# Patient Record
Sex: FEMALE | Employment: UNEMPLOYED | URBAN - METROPOLITAN AREA
[De-identification: names, ages, dates, MRNs, and addresses within clinical notes are randomized per-mention and may not be internally consistent; named-entity substitution may affect disease eponyms.]

---

## 2022-09-28 ENCOUNTER — LAB REQUISITION (OUTPATIENT)
Dept: LAB | Facility: CLINIC | Age: 22
End: 2022-09-28
Payer: COMMERCIAL

## 2022-09-28 DIAGNOSIS — U07.1 COVID-19: ICD-10-CM

## 2022-09-28 PROCEDURE — U0003 INFECTIOUS AGENT DETECTION BY NUCLEIC ACID (DNA OR RNA); SEVERE ACUTE RESPIRATORY SYNDROME CORONAVIRUS 2 (SARS-COV-2) (CORONAVIRUS DISEASE [COVID-19]), AMPLIFIED PROBE TECHNIQUE, MAKING USE OF HIGH THROUGHPUT TECHNOLOGIES AS DESCRIBED BY CMS-2020-01-R: HCPCS | Mod: ORL | Performed by: INTERNAL MEDICINE

## 2022-09-29 LAB — SARS-COV-2 RNA RESP QL NAA+PROBE: NEGATIVE

## 2023-12-05 ENCOUNTER — MEDICAL CORRESPONDENCE (OUTPATIENT)
Dept: HEALTH INFORMATION MANAGEMENT | Facility: CLINIC | Age: 23
End: 2023-12-05

## 2024-01-17 ENCOUNTER — TRANSFERRED RECORDS (OUTPATIENT)
Dept: HEALTH INFORMATION MANAGEMENT | Facility: CLINIC | Age: 24
End: 2024-01-17
Payer: COMMERCIAL

## 2024-01-17 LAB — PHQ9 SCORE: 14

## 2024-02-09 ENCOUNTER — TRANSCRIBE ORDERS (OUTPATIENT)
Dept: OTHER | Age: 24
End: 2024-02-09

## 2024-02-09 DIAGNOSIS — F60.3 BORDERLINE PERSONALITY DISORDER (H): ICD-10-CM

## 2024-02-09 DIAGNOSIS — F42.9 OBSESSIVE-COMPULSIVE DISORDER: Primary | ICD-10-CM

## 2024-02-09 DIAGNOSIS — F34.1 PERSISTENT DEPRESSIVE DISORDER: ICD-10-CM

## 2024-02-12 ENCOUNTER — TELEPHONE (OUTPATIENT)
Dept: NEUROPSYCHOLOGY | Facility: CLINIC | Age: 24
End: 2024-02-12
Payer: COMMERCIAL

## 2024-02-12 NOTE — TELEPHONE ENCOUNTER
Neuropsychology referral declined. Due to the overwhelmingly high volume of incoming referrals and a lengthy wait time, our clinic is currently limiting referrals to the [facility] system.  We apologize for the inconvenience. Ref prov notified via fax.    There are several community neuropsychologists that we might suggest, including:  Dr. Eliane Dexter - [phone]  Dr. Dez Bañuelos - [phone]   Dr. Melissa Stone - [phone]   Dr. Ayse Jerez - [phone]   Dr. Vanessa Gonzalez - [phone]   Dr. Iraida Quan-Oshea - [phone], [email or web address]  Dr. Sav Landin - [phone], [email or web address]   [facility] - [phone], [email or web address]   [facility], [email or web address]  [facility] - [phone], [email or web address]    Coco Duran  Psychometrist

## 2024-08-28 ENCOUNTER — TRANSCRIBE ORDERS (OUTPATIENT)
Dept: OTHER | Age: 24
End: 2024-08-28

## 2024-08-28 DIAGNOSIS — F81.9 LEARNING DISABILITY: Primary | ICD-10-CM

## 2024-08-28 DIAGNOSIS — Q21.11 OSTIUM SECUNDUM TYPE ATRIAL SEPTAL DEFECT: ICD-10-CM

## 2024-10-20 ASSESSMENT — ANXIETY QUESTIONNAIRES
3. WORRYING TOO MUCH ABOUT DIFFERENT THINGS: NOT AT ALL
2. NOT BEING ABLE TO STOP OR CONTROL WORRYING: SEVERAL DAYS
6. BECOMING EASILY ANNOYED OR IRRITABLE: SEVERAL DAYS
IF YOU CHECKED OFF ANY PROBLEMS ON THIS QUESTIONNAIRE, HOW DIFFICULT HAVE THESE PROBLEMS MADE IT FOR YOU TO DO YOUR WORK, TAKE CARE OF THINGS AT HOME, OR GET ALONG WITH OTHER PEOPLE: VERY DIFFICULT
5. BEING SO RESTLESS THAT IT IS HARD TO SIT STILL: NOT AT ALL
4. TROUBLE RELAXING: SEVERAL DAYS
1. FEELING NERVOUS, ANXIOUS, OR ON EDGE: SEVERAL DAYS
GAD7 TOTAL SCORE: 5
7. FEELING AFRAID AS IF SOMETHING AWFUL MIGHT HAPPEN: SEVERAL DAYS

## 2024-10-23 ENCOUNTER — VIRTUAL VISIT (OUTPATIENT)
Dept: PSYCHIATRY | Facility: CLINIC | Age: 24
End: 2024-10-23
Payer: COMMERCIAL

## 2024-10-23 DIAGNOSIS — F42.9 OBSESSIVE-COMPULSIVE DISORDER, UNSPECIFIED TYPE: ICD-10-CM

## 2024-10-23 DIAGNOSIS — F33.2 SEVERE RECURRENT MAJOR DEPRESSION WITHOUT PSYCHOTIC FEATURES (H): Primary | ICD-10-CM

## 2024-10-23 ASSESSMENT — PATIENT HEALTH QUESTIONNAIRE - PHQ9
10. IF YOU CHECKED OFF ANY PROBLEMS, HOW DIFFICULT HAVE THESE PROBLEMS MADE IT FOR YOU TO DO YOUR WORK, TAKE CARE OF THINGS AT HOME, OR GET ALONG WITH OTHER PEOPLE: EXTREMELY DIFFICULT
SUM OF ALL RESPONSES TO PHQ QUESTIONS 1-9: 21
SUM OF ALL RESPONSES TO PHQ QUESTIONS 1-9: 21

## 2024-10-23 ASSESSMENT — ANXIETY QUESTIONNAIRES
GAD7 TOTAL SCORE: 5
4. TROUBLE RELAXING: SEVERAL DAYS
1. FEELING NERVOUS, ANXIOUS, OR ON EDGE: SEVERAL DAYS
GAD7 TOTAL SCORE: 5
7. FEELING AFRAID AS IF SOMETHING AWFUL MIGHT HAPPEN: SEVERAL DAYS
6. BECOMING EASILY ANNOYED OR IRRITABLE: SEVERAL DAYS
8. IF YOU CHECKED OFF ANY PROBLEMS, HOW DIFFICULT HAVE THESE MADE IT FOR YOU TO DO YOUR WORK, TAKE CARE OF THINGS AT HOME, OR GET ALONG WITH OTHER PEOPLE?: VERY DIFFICULT
3. WORRYING TOO MUCH ABOUT DIFFERENT THINGS: NOT AT ALL
2. NOT BEING ABLE TO STOP OR CONTROL WORRYING: SEVERAL DAYS
IF YOU CHECKED OFF ANY PROBLEMS ON THIS QUESTIONNAIRE, HOW DIFFICULT HAVE THESE PROBLEMS MADE IT FOR YOU TO DO YOUR WORK, TAKE CARE OF THINGS AT HOME, OR GET ALONG WITH OTHER PEOPLE: VERY DIFFICULT
7. FEELING AFRAID AS IF SOMETHING AWFUL MIGHT HAPPEN: SEVERAL DAYS
GAD7 TOTAL SCORE: 5
5. BEING SO RESTLESS THAT IT IS HARD TO SIT STILL: NOT AT ALL

## 2024-10-23 NOTE — PROGRESS NOTES
"[facility] Treatment Resistant Depression Program  Diagnostic Assessment  A part of the [facility] Program    Melinda Welch MRN# [id number]   Age: 24 year old YOB: 2000     Date of Evaluation: 10/23/24  Start Time: 3:00; End Time: 4:15    Mode of communication: [employer] (HIPAA compliant, secure platform). Patient consented verbally to this mode of therapy today.  Reason for telehealth: COVID-19. This patient visit was converted to a telehealth visit to minimize exposure to COVID-19.    Originating Location (patient location): home, located in [place]  Distant Location (provider location): Home office, located in [place], [place], using appropriate privacy considerations and procedures         Care Team     PCP- No primary care provider on file.  Specialty Providers- none  Therapist- Dari Lozada at [facility]  Psychiatric Med Management Provider- Bolivar Mason  Other Mental Health Providers- [facility] worker    Referred by: psychiatrist  Referred for evaluation of:  depression         Contributors to the Assessment     Chart Reviewed.   Interview completed with Melinda Welch.  Releases of information signed?  no  Collateral information obtained? no           Chief Complaint     Depression with suicidal ideation that \"feels like a . I'm the poster child for depression\"         Psychiatric History and Present Illness      Melinda Welch is a 24 year old single (never ) female who goes by Melinda and uses she/her pronouns.    Melinda reports one lifetime episode(s) of depression and has a history of six psychiatric hospitalization(s).     Melinda's first episode of depression started before she was 14 years old. Melinda reports that since then there may have been on period of about six months without  symptoms.    Current psychosocial stressors include not working, applying for SSDI, waiver assessment process     Melinda is interested in TMS or ketamine " "treatment.      Past diagnoses: MDD, OCD, ADHD, borderline PD    Past medication trials: multiple trials    Hospitalizations: six, most recent 4/2023    Commitment: No, Current Sandoval order: No    ECT trials: No    TMS trials:  No      Ketamine:  No    Suicide attempts: Yes - three    Self-injurious behavior: No    Aggressive or violent behavior: No    Past Outpatient Programs & Services  Medication management, Outpatient individual psychotherapy, Intensive outpatient program (IOP), and DBT    Psych critical item history suicide attempt, trauma hx, multiple psychotropic trials, and psych hospitalization    Other mental health concerns discussed: anxiety, trauma, OCD    Sleep study: yes, no diagnosis, no CPAP    ADHD testing: yes, diagnosis, no medication    ASD assessment:  yes, multiple assessments with conflicting results about a diagnosis     Current Outpatient Programs & Services  Medication management, Outpatient individual psychotherapy, and Adult Rehabilitative Mental Health Services (ARM) Worker         Psychiatric Review of Systems (Completed M.I.N.I. Version 7.0.2)     A. DEPRESSION  Past 2 Weeks:  low mood nearly every day, anhedonia most of the time, appetite change (decrease), difficulties with sleep, low energy, worthlessness and/or guilt, difficulty concentrating, thinking or making decisions, and suicidal ideation with plan, without intent    Past Episode:  low mood nearly every day, anhedonia most of the time, difficulties with sleep, psychomotor changes (retardation), low energy, worthlessness and/or guilt, difficulty concentrating, thinking or making decisions, and suicidal ideation with plan, with intent    B. SUICIDALITY:  Risk: High  Current suicidal ideation: Yes, reports a plan, and denies intent.     -reports 4% in response to \"How likely are to you to try to kill yourself within the next 3 months on a scale from 0-100%?\"  -denies current SIB/Self Injurious Behavior and denies past " "SIB    -reports three lifetime suicide attempts.  She has notable risk factors for self-harm including previous suicide attempt, feels trapped, hopelessness, lives alone/ isolated, financial/legal stress, and new/ worsening medical issue.  However, risk is mitigated by describes a safety plan, future oriented, none to minimal alcohol use , commitment to family, and stable housing.      C. FOREIGN/HYPOMANIA  Current Episode:  none    Past Episode:  none    D. PANIC:  none    E. AGORAPHOBIA:  none    F. SOCIAL ANXIETY:  none    G. OBSESSIVE-COMPULSIVE:  obsessions and compulsions    Melinda reports a current diagnosis of OCD. She has engaged in ERP in the past and she will be re-starting ERP. She describes her compulsions as \"more mental compulsions\" than behavioral.    H. TRAUMA:  experienced traumatic event    I. ALCOHOL & J. NON-ALCOHOL:  See below    K. PSYCHOSIS:   none    L-M. EATING DISORDER:  Melinda reports past and present disordered eating behaviors. She is currently participating in a weight loss program. She notes that she has PCOS, which complicates eating, weight, and weight loss.    N. GENERALIZED ANXIETY:  none    O. RULE OUT MEDICAL, ORGANIC OR DRUG CAUSES FOR ALL DISORDERS  During any current disorder or past mood episode, patient reports:  A. Substance use or withdrawal: No  B. Medical illness: Yes    P. ANTISOCIAL PERSONALITY:  none     Other Cluster B Traits Identified (not formally assessed):  none discussed    PHQ-9 Developed by Klarissa Oliva,Mariam Queen,Jorge Galo and Colleagues,with an Educational Scott From [employer]  1.  Little interest or pleasure in doing things: (Patient-Rptd) Nearly every day  2.  Feeling down, depressed, or hopeless: (Patient-Rptd) Nearly every day  3.  Trouble falling or staying asleep, or sleeping too much: (Patient-Rptd) Several days  4.  Feeling tired or having little energy: (Patient-Rptd) More than half the days  5.  Poor appetite or overeating: " (Patient-Rptd) Nearly every day  6.  Feeling bad about yourself - or that you are a failure or have let yourself or your family down: (Patient-Rptd) More than half the days  7.  Trouble concentrating on things, such as reading the newspaper or watching television: (Patient-Rptd) Several days  8.  Moving or speaking so slowly that other people could have noticed. Or the opposite - being so fidgety or restless that you have been moving around a lot more than usual: (Patient-Rptd) Nearly every day  9.  Thoughts that you would be better off dead, or of hurting yourself in some way: (Patient-Rptd) Nearly every day  PHQ-9 Total Score: (Patient-Rptd) 21     SUBSTANCE USE HISTORY                                                                 CAGE-AID = 0    Have you felt you ought to cut down on your drinking or drug use? no  Have people annoyed you by criticizing your drinking or drug use? no  Have you felt bad or guilty about your drinking or drug use? no  Have you ever had a drink or used drugs first thing in the morning to steady your nerves or to get rid of a hangover? no      RECENT SUBSTANCE USE:     TOBACCO- none   CAFFEINE-  none, increases anxiety   ALCOHOL- none     CANNABIS- none            OTHER ILLICIT DRUGS- none    Past Use-   TOBACCO- none   CAFFEINE-  none, increases anxiety   ALCOHOL- none     CANNABIS- none            OTHER ILLICIT DRUGS- none    CD Treatment history: No  Medical consequences due to use (eg HIV/Hepatitis)- No  Legal consequences due to use- No    SOCIAL and FAMILY HISTORY                                                             Melinda Welch is a 24 year old single (never )  female with a psychiatric history of depression, anxiety, OCD, ADHD who presents for a [facility] Treatment Resistant Depression program evaluation. Melinda was referred by her psychiatrist.     Living situation: Melinda lives with her parents in a Private Residence.   Kids? No  Pets? Yes - a Rodriguez  Retiever  Guns, weapons, or other means to harm oneself in the home? No     Education: Melinda s highest level of education is some college    Occupation: Melinda is currently not working. She is in the process of applying for SSDI, getting assessed for waiver serivices    Finances: Melinda is financial supported by Family Support    Relationships (kid/grandkids, spouse/partner, friends, support people): Specific Relationships & Quality of Relationship: Melinda is close with her father and feels supported by him. She has a good friend locally and one who lives out of state who she talks with    Spiritual considerations: No    Legal History: No    Trauma/Abuse History: Yes - does not endorse all of criteria A or any of B     History: No    Family Mental Health History: mother - anxiety, sister, - anxiety, depression, borderline PD    Strengths & Coping Strategies:  Melinda is pleasant and east to engage. She has a MH care team and is pursuing additional treatment options, as well as waiver services and SSDI    PAST PSYCH MED TRIALS      Will be reviewed during MTM.    MEDICAL / SURGICAL HISTORY                                   There is no problem list on file for this patient.      No past surgical history on file.     History of seizures: no   History of head trauma/loss of consciousness: no     ALLERGY                                Seasonal allergies    MEDICATIONS                               Current Outpatient Medications   Medication Sig Dispense Refill    acetylcysteine (N-ACETYL CYSTEINE) 600 MG CAPS capsule Take 1 capsule by mouth 2 times daily.      brexpiprazole (REXULTI) 2 MG tablet Take 2 mg by mouth daily.      calcium carbonate (TUMS) 500 MG chewable tablet Take 2 chew tab by mouth daily.      cholecalciferol (VITAMIN D3) 25 mcg (1000 units) capsule Take 1 capsule by mouth daily.      cyanocobalamin (VITAMIN B-12) 500 MCG tablet Take 500 mcg by mouth daily.      docusate sodium (DSS) 100 MG capsule  Take 100 mg by mouth 2 times daily as needed for constipation.      drospirenone-ethinyl estradiol (LUIS) 3-0.02 MG tablet Take 1 tablet by mouth daily.      FLUoxetine (PROZAC) 20 MG capsule Take 20 mg by mouth daily      fluticasone (FLONASE) 50 MCG/ACT nasal spray Spray 2 sprays in nostril daily.      fluvoxaMINE (LUVOX) 100 MG tablet Take 1 tablet by mouth daily.      gabapentin (NEURONTIN) 300 MG/6ML oral solution Take 300 mg by mouth 2 times daily.      hydrOXYzine carlos (VISTARIL) 25 MG capsule Take 25 mg by mouth 3 times daily as needed.      lamoTRIgine (LAMICTAL) 25 MG chewable tablet Take 1 tablet by mouth daily.      loratadine (CLARITIN) 10 MG tablet Take 10 mg by mouth daily (Patient not taking: Reported on 10/22/2024)      polyethylene glycol (MIRALAX) powder Take 17 g by mouth daily (Patient not taking: Reported on 10/22/2024) 510 g 1    senna-docusate (AMBER-COLACE) 8.6-50 MG per tablet Take 1-2 tablets by mouth 2 times daily as needed for constipation (Patient not taking: Reported on 10/22/2024) 20 tablet 1       VITALS                                                                                                                            3, 3   There were no vitals taken for this visit.     MENTAL STATUS EXAM                                                                                    9, 14 cog gs     Alertness: alert  and oriented  Appearance: adequately groomed  Behavior/Demeanor: cooperative, pleasant, and calm, with fair  eye contact   Speech: normal  Language: intact  Psychomotor: normal or unremarkable  Mood: description consistent with euthymia  Affect: full range; was congruent to mood; was congruent to content  Thought Process/Associations: unremarkable  Thought Content:  Reports suicidal ideation with plan; without intent [details in Interim History];  Denies violent ideation  Perception:  Reports none;  Denies auditory hallucinations and visual hallucinations  Insight:  adequate  Judgment: adequate for safety  Cognition: does appear grossly intact; formal cognitive testing was not done    PSYCHIATRIC DIAGNOSES                                                                                               Major depressive disorder    Obsessive compulsive disorder, by history      ASSESSMENT                                                                                                          m2, h3     Please note, writer did not receive all pertinent medical records as of the time of this assessment. Melinda did not sign REVA's for additional records.     Today, Melinda presents as a Fair historian with Adequate insight. Melinda s past and present depressive symptoms seem consistent with a diagnosis of major depressive disorder. Depressive symptoms seem to contribute to impaired functioning in the areas of social relationships, occupational performance, and emotional wellbeing . Precipitating factors may include early onset of symptoms. Perpetuating factors may consist of multiple medication trials.     The M.I.N.I. doesn't score positively for additional diagnoses. She has a current diagnosis of OCD. Melinda reports past and present disordered eating behaviors. She is currently participating in a weight loss program. She notes that she has PCOS, which complicates eating, weight, and weight loss.    Substance use does not seem to be a current problem.     Further diagnostic information is not needed to clarify additional diagnoses.     Basic needs (food, housing, transportation, safety, income stability): met    Today, based on all available evidence, she does not appear to be at imminent risk for self-harm therefore does not meet criteria for a 72-hr hold/  involuntary hospitalization.     Additional steps to minimize risk discussed, include: people to reach out to, providers to reach out to, crisis numbers and texts, and EmPATH.  24/7 crisis resources were provided verbally.     PLAN                                                                                                                         m2, h3   Patient will meet with TRD program PharmD and TRD program Psychiatrist to complete comprehensive multi-disciplinary assessment. Informed Melinda that if appropriate for Interventional Psychiatry treatments, care will be provided with goal of stabilization with subsequent transfer back to the community (i.e. PCP or previous psychiatrist).    Medications: to be addressed during an MTM visit and new patient medication evaluation with a Psychiatrist    Therapy:  Yes - re-start ERP as planned    Crisis Resources provided:  24/7 crisis resources including but not limited to the following:   - National Suicide Prevention Hotline: 3-802-478-TALK (4780)   - Crisis Text Line: Text START to 885-326   - Mental Health Emergency Number: 988   - EmPATH at [employer]Notes/[facility]    Other Referrals:  No    Discussed: consultation model of Treatment Resistant Depression (TRD) Program, limits of consultation model, requirements of the program    RTC: For MTM visit.    CHASE Jose           Melinda is a 24 year old who is being evaluated via a billable video visit.    How would you like to obtain your AVS? MyChart  If the video visit is dropped, the invitation should be resent by: Text to cell phone: [phone]  Will anyone else be joining your video visit? No    Video Visit Details  Type of service:  Video Visit   Video End Time: 3:00 - 4:15  Originating Location (pt. Location): Home    Distant Location (provider location):  Off-site  Platform used for Video Visit: Jose  Signed Electronically by: CHASE Jose     Answers submitted by the patient for this visit:  Patient Health Questionnaire (Submitted on 10/23/2024)  If you checked off any problems, how difficult have these problems made it for you to do your work, take care of things at home, or get along with other people?: Extremely  difficult  PHQ9 TOTAL SCORE: 21  Patient Health Questionnaire (G7) (Submitted on 10/23/2024)  RU 7 TOTAL SCORE: 5

## 2025-01-02 ENCOUNTER — MYC MEDICAL ADVICE (OUTPATIENT)
Dept: PSYCHIATRY | Facility: CLINIC | Age: 25
End: 2025-01-02

## 2025-01-02 ENCOUNTER — TELEPHONE (OUTPATIENT)
Dept: PSYCHIATRY | Facility: CLINIC | Age: 25
End: 2025-01-02

## 2025-01-07 ENCOUNTER — OFFICE VISIT (OUTPATIENT)
Dept: NEUROLOGY | Facility: CLINIC | Age: 25
End: 2025-01-07
Payer: COMMERCIAL

## 2025-01-07 DIAGNOSIS — F41.9 ANXIETY: Primary | ICD-10-CM

## 2025-01-07 DIAGNOSIS — F81.9 DELAY OF COGNITIVE DEVELOPMENT: ICD-10-CM

## 2025-01-07 DIAGNOSIS — F42.9 OBSESSIVE-COMPULSIVE DISORDER, UNSPECIFIED TYPE: ICD-10-CM

## 2025-01-07 DIAGNOSIS — F33.2 SEVERE EPISODE OF RECURRENT MAJOR DEPRESSIVE DISORDER, WITHOUT PSYCHOTIC FEATURES (H): ICD-10-CM

## 2025-01-07 NOTE — PROGRESS NOTES
Patient was seen for neuropsychological evaluation at the request of Dr. Kateryna Boland, for the purposes of diagnostic clarification and treatment planning.  1 hrs 58 min of test administration and scoring were provided by this writer, Zakiya Winchester.  Please see Dr. Pablo Suazo's report for a full interpretation of the findings.

## 2025-01-09 ENCOUNTER — TELEPHONE (OUTPATIENT)
Dept: PSYCHIATRY | Facility: CLINIC | Age: 25
End: 2025-01-09
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
## 2025-01-10 NOTE — PROGRESS NOTES
Name: Melinda Welch MRN: [id number]  : 2000  SHAIKH: 2025  Staff: JORGE Tech: CINDA Age: 24  Sex: Female Hand: Right Educ: 13  Vision: 20/20 ?with correction / []without correction    ORIENTATION     Time  -0     Place       Personal info          Presidents     TOMM T1: 36  T2: 46 T3: 49    WRAT5   SS %ile Grade Equiv.     Word Reading  78 7 5.7    WAIS-IV     VCI: 89    DIGNA: 73     FSIQ:   74     WMI: 74    PSI: 74       Raw SSa     Similarities  26 10     Vocabulary  17 5     Information  11 9     Block Design  24 6     Matrix Reasoning 8 4     Visual Puzzles  9 6     Digit Span  21 6 RDS= 7     Arithmetic  7 5     Symbol Search  21 6     Coding  36 4    TRAILS Raw  Err ScS T      A 36  0 8 33      B 132  0 6 22     HVLT-R     Trial 1 2 3 Total      3 6 7  16  Raw T     Total Recall (1-3) 16 <=20     Delayed Recall  7 27     Percent Retention 100 53     Recognition Hits/FP 10/4 <=20    BVMT-R     Trial 1 2 3 Total      2 3 4  9  Raw T / %ile     Total Recall (1-3) 9 <20     Delayed Recall  2 <20     Percent Retention 50 <1st     Recognition Hits/FP 6/2 1st-2nd    BOSTON NAMING TEST-15   Raw: 11   Z-Score: -1.00    COWAT ( FAS)   Raw: 20  ScS: 5 T: 25        HAILEY-O    Raw    T %ile     Copy    21.0     <=1     Time to Copy  257     >16    BDI-II  Raw:  50  Interpretation: Severe    MICHA  Raw:  16  Interpretation: Moderate

## 2025-01-18 ASSESSMENT — ANXIETY QUESTIONNAIRES
IF YOU CHECKED OFF ANY PROBLEMS ON THIS QUESTIONNAIRE, HOW DIFFICULT HAVE THESE PROBLEMS MADE IT FOR YOU TO DO YOUR WORK, TAKE CARE OF THINGS AT HOME, OR GET ALONG WITH OTHER PEOPLE: SOMEWHAT DIFFICULT
7. FEELING AFRAID AS IF SOMETHING AWFUL MIGHT HAPPEN: SEVERAL DAYS
5. BEING SO RESTLESS THAT IT IS HARD TO SIT STILL: NOT AT ALL
GAD7 TOTAL SCORE: 2
4. TROUBLE RELAXING: SEVERAL DAYS
GAD7 TOTAL SCORE: 2
7. FEELING AFRAID AS IF SOMETHING AWFUL MIGHT HAPPEN: SEVERAL DAYS
6. BECOMING EASILY ANNOYED OR IRRITABLE: NOT AT ALL
1. FEELING NERVOUS, ANXIOUS, OR ON EDGE: NOT AT ALL
2. NOT BEING ABLE TO STOP OR CONTROL WORRYING: NOT AT ALL
3. WORRYING TOO MUCH ABOUT DIFFERENT THINGS: NOT AT ALL
8. IF YOU CHECKED OFF ANY PROBLEMS, HOW DIFFICULT HAVE THESE MADE IT FOR YOU TO DO YOUR WORK, TAKE CARE OF THINGS AT HOME, OR GET ALONG WITH OTHER PEOPLE?: SOMEWHAT DIFFICULT
GAD7 TOTAL SCORE: 2

## 2025-01-22 ASSESSMENT — PATIENT HEALTH QUESTIONNAIRE - PHQ9
SUM OF ALL RESPONSES TO PHQ QUESTIONS 1-9: 21
SUM OF ALL RESPONSES TO PHQ QUESTIONS 1-9: 21
10. IF YOU CHECKED OFF ANY PROBLEMS, HOW DIFFICULT HAVE THESE PROBLEMS MADE IT FOR YOU TO DO YOUR WORK, TAKE CARE OF THINGS AT HOME, OR GET ALONG WITH OTHER PEOPLE: EXTREMELY DIFFICULT

## 2025-01-23 ENCOUNTER — VIRTUAL VISIT (OUTPATIENT)
Dept: PSYCHIATRY | Facility: CLINIC | Age: 25
End: 2025-01-23
Payer: COMMERCIAL

## 2025-01-23 DIAGNOSIS — F33.2 SEVERE EPISODE OF RECURRENT MAJOR DEPRESSIVE DISORDER, WITHOUT PSYCHOTIC FEATURES (H): Primary | ICD-10-CM

## 2025-01-23 DIAGNOSIS — F42.9 OBSESSIVE-COMPULSIVE DISORDER, UNSPECIFIED TYPE: ICD-10-CM

## 2025-01-23 RX ORDER — FLUVOXAMINE MALEATE 150 MG/1
150 CAPSULE, EXTENDED RELEASE ORAL 2 TIMES DAILY
COMMUNITY
Start: 2025-01-21

## 2025-01-23 RX ORDER — PROPRANOLOL HCL 20 MG
1 TABLET ORAL
COMMUNITY
Start: 2025-01-15

## 2025-01-23 NOTE — PROGRESS NOTES
Is the patient currently in the state of [place]? YES    Visit mode:VIDEO    If the visit is dropped, the patient can be reconnected by: TELEPHONE VISIT: Phone number: [phone]    Will anyone else be joining the visit? Yes , mom and dad , does not need the link.      How would you like to obtain your AVS? MyChart    Are changes needed to the allergy or medication list? NO    Reason for visit: Follow Up       Answers submitted by the patient for this visit:  Patient Health Questionnaire (Submitted on 1/22/2025)  If you checked off any problems, how difficult have these problems made it for you to do your work, take care of things at home, or get along with other people?: Extremely difficult  PHQ9 TOTAL SCORE: 21  Patient Health Questionnaire (G7) (Submitted on 1/18/2025)  RU 7 TOTAL SCORE: 2

## 2025-01-29 ENCOUNTER — OFFICE VISIT (OUTPATIENT)
Dept: FAMILY MEDICINE | Facility: CLINIC | Age: 25
End: 2025-01-29
Payer: COMMERCIAL

## 2025-01-29 ENCOUNTER — TELEPHONE (OUTPATIENT)
Dept: PSYCHIATRY | Facility: CLINIC | Age: 25
End: 2025-01-29

## 2025-01-29 VITALS
TEMPERATURE: 98 F | HEIGHT: 65 IN | SYSTOLIC BLOOD PRESSURE: 133 MMHG | WEIGHT: 199.2 LBS | DIASTOLIC BLOOD PRESSURE: 87 MMHG | HEART RATE: 97 BPM | BODY MASS INDEX: 33.19 KG/M2 | OXYGEN SATURATION: 97 % | RESPIRATION RATE: 20 BRPM

## 2025-01-29 DIAGNOSIS — E28.2 PCOS (POLYCYSTIC OVARIAN SYNDROME): ICD-10-CM

## 2025-01-29 DIAGNOSIS — E66.811 CLASS 1 OBESITY WITHOUT SERIOUS COMORBIDITY WITH BODY MASS INDEX (BMI) OF 33.0 TO 33.9 IN ADULT, UNSPECIFIED OBESITY TYPE: ICD-10-CM

## 2025-01-29 DIAGNOSIS — R73.03 PREDIABETES: ICD-10-CM

## 2025-01-29 DIAGNOSIS — M54.50 CHRONIC BILATERAL LOW BACK PAIN WITHOUT SCIATICA: Primary | ICD-10-CM

## 2025-01-29 DIAGNOSIS — G89.29 CHRONIC BILATERAL LOW BACK PAIN WITHOUT SCIATICA: Primary | ICD-10-CM

## 2025-01-29 DIAGNOSIS — G44.209 TENSION HEADACHE: ICD-10-CM

## 2025-01-29 LAB
BASOPHILS # BLD AUTO: 0 10E3/UL (ref 0–0.2)
BASOPHILS NFR BLD AUTO: 1 %
EOSINOPHIL # BLD AUTO: 0.1 10E3/UL (ref 0–0.7)
EOSINOPHIL NFR BLD AUTO: 1 %
ERYTHROCYTE [DISTWIDTH] IN BLOOD BY AUTOMATED COUNT: 14.4 % (ref 10–15)
EST. AVERAGE GLUCOSE BLD GHB EST-MCNC: 123 MG/DL
HBA1C MFR BLD: 5.9 % (ref 0–5.6)
HCT VFR BLD AUTO: 39.5 % (ref 35–47)
HGB BLD-MCNC: 12.5 G/DL (ref 11.7–15.7)
IMM GRANULOCYTES # BLD: 0 10E3/UL
IMM GRANULOCYTES NFR BLD: 0 %
LYMPHOCYTES # BLD AUTO: 1.2 10E3/UL (ref 0.8–5.3)
LYMPHOCYTES NFR BLD AUTO: 26 %
MCH RBC QN AUTO: 25.7 PG (ref 26.5–33)
MCHC RBC AUTO-ENTMCNC: 31.6 G/DL (ref 31.5–36.5)
MCV RBC AUTO: 81 FL (ref 78–100)
MONOCYTES # BLD AUTO: 0.4 10E3/UL (ref 0–1.3)
MONOCYTES NFR BLD AUTO: 8 %
NEUTROPHILS # BLD AUTO: 3 10E3/UL (ref 1.6–8.3)
NEUTROPHILS NFR BLD AUTO: 64 %
PLATELET # BLD AUTO: 345 10E3/UL (ref 150–450)
RBC # BLD AUTO: 4.87 10E6/UL (ref 3.8–5.2)
WBC # BLD AUTO: 4.7 10E3/UL (ref 4–11)

## 2025-01-29 PROCEDURE — 99204 OFFICE O/P NEW MOD 45 MIN: CPT

## 2025-01-29 PROCEDURE — 82728 ASSAY OF FERRITIN: CPT

## 2025-01-29 PROCEDURE — 83036 HEMOGLOBIN GLYCOSYLATED A1C: CPT

## 2025-01-29 PROCEDURE — 85025 COMPLETE CBC W/AUTO DIFF WBC: CPT

## 2025-01-29 PROCEDURE — 83540 ASSAY OF IRON: CPT

## 2025-01-29 PROCEDURE — 83550 IRON BINDING TEST: CPT

## 2025-01-29 PROCEDURE — 84443 ASSAY THYROID STIM HORMONE: CPT

## 2025-01-29 PROCEDURE — 80053 COMPREHEN METABOLIC PANEL: CPT

## 2025-01-29 PROCEDURE — 36415 COLL VENOUS BLD VENIPUNCTURE: CPT

## 2025-01-29 RX ORDER — ACETAMINOPHEN 500 MG
500 TABLET ORAL
COMMUNITY
Start: 2024-01-04

## 2025-01-29 RX ORDER — ALBUTEROL SULFATE 90 UG/1
1-2 INHALANT RESPIRATORY (INHALATION)
COMMUNITY
Start: 2023-02-17

## 2025-01-29 NOTE — TELEPHONE ENCOUNTER
Called to follow-up on referral for psychotherapy as part of treatment in [facility]. Reached voicemail, let pt know I will send MyChart message they can respond to, or call in to clinic.    Abdirahman Senior, PhD LP on 1/29/2025 at 1:17 PM

## 2025-01-29 NOTE — PROGRESS NOTES
"  Assessment & Plan     Chronic bilateral low back pain without sciatica  Has completed physical therapy in the past which was helpful. Referral placed for PT.   - Physical Therapy  Referral; Future    Class 1 obesity without serious comorbidity with body mass index (BMI) of 33.0 to 33.9 in adult, unspecified obesity type  Due to med list and possible interactions, weight management referral placed.   - Adult Comprehensive Weight Management  Referral; Future    PCOS (polycystic ovarian syndrome)  Has a number of symptoms related to PCOS that are concerning to patient. Is currently on metformin and CLINTON which has been helpful. Referral placed for Ob/Gyn for further evaluation and treatment if necessary   - Ob/Gyn  Referral; Future    Prediabetes  On 1000mg of metformin twice a day. Hgba1c today 5.9.   - Hemoglobin A1c; Future  - Hemoglobin A1c    Tension headache  Labs ordered as indicated below. Encouraged patient to increase water intake to 60 oz a day and use ibuprofen for pain relief.   - CBC with platelets and differential; Future  - Iron & Iron Binding Capacity; Future  - Ferritin; Future  - Comprehensive metabolic panel (BMP + Alb, Alk Phos, ALT, AST, Total. Bili, TP); Future  - TSH with free T4 reflex; Future  - CBC with platelets and differential  - Iron & Iron Binding Capacity  - Ferritin  - Comprehensive metabolic panel (BMP + Alb, Alk Phos, ALT, AST, Total. Bili, TP)  - TSH with free T4 reflex    BMI  Estimated body mass index is 33.15 kg/m  as calculated from the following:    Height as of this encounter: 1.651 m (5' 5\").    Weight as of this encounter: 90.4 kg (199 lb 3.2 oz).         Gabriella Lawrence is a 24 year old, presenting for the following health issues:  Patient Request (PCOS, pre diabetes, ), Pain (Low back legs, hands, arms, feet.  ), Pelvic Pain, and TMJ        1/29/2025     2:26 PM   Additional Questions   Roomed by emily forrest     History of Present Illness " "      Back Pain:  She presents for follow up of back pain. Patient's back pain is a recurring problem.  Location of back pain:  Right lower back, left lower back, right middle of back, left middle of back, right upper back, left upper back, right side of neck, left side of neck, right shoulder, left shoulder, right buttock, left buttock, right hip, left hip and other  Description of back pain: dull ache and other  Back pain spreads: right buttocks, left buttocks, right thigh, left thigh, right knee, left knee, right foot, left foot, right shoulder, left shoulder, right side of neck and left side of neck    Since patient first noticed back pain, pain is: always present, but gets better and worse  Does back pain interfere with her job:  Not applicable       Diabetes:   She presents for follow up of diabetes.    She is not checking blood glucose.        She is concerned about other.   She is having weight gain.            Headaches:   Since the patient's last clinic visit, headaches are: worsened  The patient is getting headaches:  Everyday when i wake up  She is not able to do normal daily activities when she has a migraine.  The patient is taking the following rescue/relief medications:  Tylenol   Patient states \"I get some relief\" from the rescue/relief medications.   The patient is taking the following medications to prevent migraines:  No medications to prevent migraines  In the past 4 weeks, the patient has gone to an Urgent Care or Emergency Room 0 times times due to headaches.    Reason for visit:  Talk about my new heealth conditions having migraines with a lack of sleep and more    She eats 0-1 servings of fruits and vegetables daily.She consumes 1 sweetened beverage(s) daily.She exercises with enough effort to increase her heart rate 9 or less minutes per day.  She exercises with enough effort to increase her heart rate 3 or less days per week. She is missing 3 dose(s) of medications per week.  She is not " "taking prescribed medications regularly due to remembering to take.     Has been on Melanie, has experienced spotting   Last known period was in August (without spotting)  Facial hair   Difficulty with weight loss   Has been previously diagnosed with PCOS   Was on a high dose of spironolactone didn't like side effects   Takes 1000mg of metformin twice a day, Feels like it has been helpful  Has noticed a difficulty with weight loss   Hgba1c 6, has been on metformin for prediabetes     Was doing physical therapy for back pain which was helpful.   Last time she did physical therapy October 2024  Pain to Lower back  Had a fall last year on ice, had pain before the fall, after the fall pain got worst    Headaches  Really difficult to get out of bed  Took tylenol which was not helpful  Started last week  Pain to frontal sinuses, TMJ, no congestion   Mild neck pain   Feels like a tight band around head  Has a history of migraines  Current headache feels different from that  No recent illnesses   Has never used migraine medication   Drinks 48 oz of water a day   Pain is worst in the morning, gets better throughout the day   Reports pain is at a 0/10 currently, usually a 5-6/10.   Has not tried ibuprofen  No vision changes        Objective    /87 (BP Location: Left arm, Patient Position: Sitting, Cuff Size: Adult Regular)   Pulse 97   Temp 98  F (36.7  C) (Temporal)   Resp 20   Ht 1.651 m (5' 5\")   Wt 90.4 kg (199 lb 3.2 oz)   LMP 08/01/2024 (Within Days)   SpO2 97%   BMI 33.15 kg/m    Body mass index is 33.15 kg/m .  Physical Exam   GENERAL: alert and no distress  EYES: Eyes grossly normal to inspection, PERRL and conjunctivae and sclerae normal  HENT: ear canals and TM's normal, nose and mouth without ulcers or lesions  NECK: no adenopathy, no asymmetry, masses, or scars  RESP: lungs clear to auscultation - no rales, rhonchi or wheezes  CV: regular rate and rhythm, normal S1 S2, no S3 or S4, no murmur, click or " rub, no peripheral edema  NEURO: Normal strength and tone, mentation intact, and cranial nerves 2-12 intact  PSYCH: mentation appears normal and anxious            Signed Electronically by: TOMAS Zapata CNP

## 2025-01-30 LAB
ALBUMIN SERPL BCG-MCNC: 4.3 G/DL (ref 3.5–5.2)
ALP SERPL-CCNC: 113 U/L (ref 40–150)
ALT SERPL W P-5'-P-CCNC: 21 U/L (ref 0–50)
ANION GAP SERPL CALCULATED.3IONS-SCNC: 9 MMOL/L (ref 7–15)
AST SERPL W P-5'-P-CCNC: 21 U/L (ref 0–45)
BILIRUB SERPL-MCNC: 0.2 MG/DL
BUN SERPL-MCNC: 12.2 MG/DL (ref 6–20)
CALCIUM SERPL-MCNC: 9.8 MG/DL (ref 8.8–10.4)
CHLORIDE SERPL-SCNC: 105 MMOL/L (ref 98–107)
CREAT SERPL-MCNC: 0.74 MG/DL (ref 0.51–0.95)
EGFRCR SERPLBLD CKD-EPI 2021: >90 ML/MIN/1.73M2
FERRITIN SERPL-MCNC: 9 NG/ML (ref 6–175)
GLUCOSE SERPL-MCNC: 105 MG/DL (ref 70–99)
HCO3 SERPL-SCNC: 25 MMOL/L (ref 22–29)
IRON BINDING CAPACITY (ROCHE): 424 UG/DL (ref 240–430)
IRON SATN MFR SERPL: 12 % (ref 15–46)
IRON SERPL-MCNC: 51 UG/DL (ref 37–145)
POTASSIUM SERPL-SCNC: 4.7 MMOL/L (ref 3.4–5.3)
PROT SERPL-MCNC: 7.3 G/DL (ref 6.4–8.3)
SODIUM SERPL-SCNC: 139 MMOL/L (ref 135–145)
TSH SERPL DL<=0.005 MIU/L-ACNC: 0.92 UIU/ML (ref 0.3–4.2)

## 2025-02-04 ENCOUNTER — VIRTUAL VISIT (OUTPATIENT)
Dept: PSYCHIATRY | Facility: CLINIC | Age: 25
End: 2025-02-04
Payer: COMMERCIAL

## 2025-02-04 DIAGNOSIS — F33.2 SEVERE EPISODE OF RECURRENT MAJOR DEPRESSIVE DISORDER, WITHOUT PSYCHOTIC FEATURES (H): Primary | ICD-10-CM

## 2025-02-04 ASSESSMENT — ANXIETY QUESTIONNAIRES
GAD7 TOTAL SCORE: 4
GAD7 TOTAL SCORE: 4
8. IF YOU CHECKED OFF ANY PROBLEMS, HOW DIFFICULT HAVE THESE MADE IT FOR YOU TO DO YOUR WORK, TAKE CARE OF THINGS AT HOME, OR GET ALONG WITH OTHER PEOPLE?: SOMEWHAT DIFFICULT
7. FEELING AFRAID AS IF SOMETHING AWFUL MIGHT HAPPEN: SEVERAL DAYS
IF YOU CHECKED OFF ANY PROBLEMS ON THIS QUESTIONNAIRE, HOW DIFFICULT HAVE THESE PROBLEMS MADE IT FOR YOU TO DO YOUR WORK, TAKE CARE OF THINGS AT HOME, OR GET ALONG WITH OTHER PEOPLE: SOMEWHAT DIFFICULT
5. BEING SO RESTLESS THAT IT IS HARD TO SIT STILL: NOT AT ALL
GAD7 TOTAL SCORE: 4
6. BECOMING EASILY ANNOYED OR IRRITABLE: SEVERAL DAYS
IF YOU CHECKED OFF ANY PROBLEMS ON THIS QUESTIONNAIRE, HOW DIFFICULT HAVE THESE PROBLEMS MADE IT FOR YOU TO DO YOUR WORK, TAKE CARE OF THINGS AT HOME, OR GET ALONG WITH OTHER PEOPLE: SOMEWHAT DIFFICULT
1. FEELING NERVOUS, ANXIOUS, OR ON EDGE: SEVERAL DAYS
8. IF YOU CHECKED OFF ANY PROBLEMS, HOW DIFFICULT HAVE THESE MADE IT FOR YOU TO DO YOUR WORK, TAKE CARE OF THINGS AT HOME, OR GET ALONG WITH OTHER PEOPLE?: SOMEWHAT DIFFICULT
3. WORRYING TOO MUCH ABOUT DIFFERENT THINGS: NOT AT ALL
5. BEING SO RESTLESS THAT IT IS HARD TO SIT STILL: NOT AT ALL
7. FEELING AFRAID AS IF SOMETHING AWFUL MIGHT HAPPEN: SEVERAL DAYS
GAD7 TOTAL SCORE: 4
1. FEELING NERVOUS, ANXIOUS, OR ON EDGE: SEVERAL DAYS
4. TROUBLE RELAXING: SEVERAL DAYS
7. FEELING AFRAID AS IF SOMETHING AWFUL MIGHT HAPPEN: SEVERAL DAYS
7. FEELING AFRAID AS IF SOMETHING AWFUL MIGHT HAPPEN: SEVERAL DAYS
GAD7 TOTAL SCORE: 4
6. BECOMING EASILY ANNOYED OR IRRITABLE: SEVERAL DAYS
2. NOT BEING ABLE TO STOP OR CONTROL WORRYING: NOT AT ALL
3. WORRYING TOO MUCH ABOUT DIFFERENT THINGS: NOT AT ALL
4. TROUBLE RELAXING: SEVERAL DAYS
GAD7 TOTAL SCORE: 4
2. NOT BEING ABLE TO STOP OR CONTROL WORRYING: NOT AT ALL

## 2025-02-04 ASSESSMENT — PATIENT HEALTH QUESTIONNAIRE - PHQ9
10. IF YOU CHECKED OFF ANY PROBLEMS, HOW DIFFICULT HAVE THESE PROBLEMS MADE IT FOR YOU TO DO YOUR WORK, TAKE CARE OF THINGS AT HOME, OR GET ALONG WITH OTHER PEOPLE: EXTREMELY DIFFICULT
SUM OF ALL RESPONSES TO PHQ QUESTIONS 1-9: 22
SUM OF ALL RESPONSES TO PHQ QUESTIONS 1-9: 22

## 2025-02-06 ENCOUNTER — OFFICE VISIT (OUTPATIENT)
Dept: PSYCHIATRY | Facility: CLINIC | Age: 25
End: 2025-02-06
Payer: COMMERCIAL

## 2025-02-06 ENCOUNTER — ALLIED HEALTH/NURSE VISIT (OUTPATIENT)
Dept: PSYCHIATRY | Facility: CLINIC | Age: 25
End: 2025-02-06
Payer: COMMERCIAL

## 2025-02-06 DIAGNOSIS — F33.2 SEVERE EPISODE OF RECURRENT MAJOR DEPRESSIVE DISORDER, WITHOUT PSYCHOTIC FEATURES (H): Primary | ICD-10-CM

## 2025-02-06 ASSESSMENT — PATIENT HEALTH QUESTIONNAIRE - PHQ9
SUM OF ALL RESPONSES TO PHQ QUESTIONS 1-9: 23
SUM OF ALL RESPONSES TO PHQ QUESTIONS 1-9: 24
SUM OF ALL RESPONSES TO PHQ QUESTIONS 1-9: 23
10. IF YOU CHECKED OFF ANY PROBLEMS, HOW DIFFICULT HAVE THESE PROBLEMS MADE IT FOR YOU TO DO YOUR WORK, TAKE CARE OF THINGS AT HOME, OR GET ALONG WITH OTHER PEOPLE: EXTREMELY DIFFICULT
10. IF YOU CHECKED OFF ANY PROBLEMS, HOW DIFFICULT HAVE THESE PROBLEMS MADE IT FOR YOU TO DO YOUR WORK, TAKE CARE OF THINGS AT HOME, OR GET ALONG WITH OTHER PEOPLE: EXTREMELY DIFFICULT
SUM OF ALL RESPONSES TO PHQ QUESTIONS 1-9: 23
SUM OF ALL RESPONSES TO PHQ QUESTIONS 1-9: 23

## 2025-02-06 NOTE — PROGRESS NOTES
Interventional Psychiatry Program  [address] [facility] [facility] [address], [address]  [place] [address]  TMS Procedure Note   Melinda Welch MRN# [id number]  Age: 24 year old year old YOB: 2000    Melinda Welch comes into clinic today at the request of Kateryan Boland MD Ordering Provider for TMS.    Pre-Procedure:  History and Physical: Reviewed in medical record  Consent Signed by: Melinda Welch for this course of treatment.  On: 02/06/2025    Reviewed possible side effects associated with treatment as well as questions regarding possible course of treatments.  Patient agreed to procedure and was able to reflect implications.    Clinical Narrative:  Patient presents to initiate an acute course of TMS for management of her symptoms of resistant depression.    Indications for TMS:   MDD, recurrent, severe; 4+ medication trials (from 2+ classes) ineffective; Psychotherapy ineffective    Procedure Diagnosis:  No diagnosis found.    Treatment Hx:  Treatment number this series: 1   Total lifetime treatment number: 1    Allergies   Allergen Reactions    Seasonal Allergies       LMP 08/01/2024 (Within Days)     Pause for the Cause  Right patient: Yes  Right procedure/correct coil:  Yes; rTMS; cpt 39101; F8 coil.   Earplugs in place:  Yes    Procedure  Patient was seated in procedure chair. Identity and procedure was verified. Ear plugs were placed in ears and patient-specific cap was placed on head and tightened appropriately. Ruler locations were verified. Bone conducting headphones were not used.  Coil was placed at F3 and stimulator was set to 71% (100% of MT).  Initial train was well tolerated so 75 trains were delivered.  Patient tolerated procedure well with minimal right eyebrow movement.    Motor Threshold Determination  Distance from nasion to inion: 36cm  Tragus to tragus distance: 39cm  Head circumference: 59.5cm  Distance along the vertex: 9.83cm  Distance along circumference from midline: 6.84cm  Cap to  Nasion: 8cm    MT 1: F3 @ 71% on 02/06/2025    Standard LDLPFC    Frequency: 10  Train Duration: 4  Total pulses delivered: 3000  Inter-train interval: 15  Tx Loc: F3  Energy: 71% (100%MT)  Trains: 75          1/22/2025     3:01 PM 2/4/2025     9:49 AM 2/6/2025     8:45 AM   PHQ-9 SCORE   PHQ-9 Total Score MyChart 21 (Severe depression) 22 (Severe depression) 23 (Severe depression)   PHQ-9 Total Score 21  22  23        Patient-reported       Date MADRS QIDS PHQ-9   02/06/25 34 22 24               Plan   - increase energy as tolerated  - continue treatments    This service provided today was under the supervising provider of the day Kateryna Boland MD, who  determined motor threshold and was available if needed.    Monserrat Padgett TMS Technician  [facility]  Mental Health Neuromodulation

## 2025-02-06 NOTE — PROGRESS NOTES
TMS Education     Melinda Welch MRN# [id number]  Age: 24 year old year old YOB: 2000        Patient is here in clinic for TMS education and consenting.  Patient will be starting TMS today on the [employer].  Writer and patient reviewed mechanics of TMS.  Discussed using magnetic pulses to stimulate and induce an electrical field inside the brain.  Discussed the difference between F8 and H1 coil.  Patient was able to verbalize understanding of this.  Discussed that the idea is that we are treating the DLPFC of the brain, as it has been shown by in studies that people who have depression have decreased activity in this part of the brain, the idea is that we stimulate this part of the brain to increase activity.       Reviewed processing of mapping and how visit today would go.  Encouraged patient to communicate with staff if treatment at anytime became painful.         Discussed side effects of TMS.  Side effects include headaches after treatment, hearing loss, lightheadedness/dizziness, short lived vision changes, and seizures.  Discussed ways that TMS Clinic helps with preventing these side effects.  Such as retesting motor thresholds and having patient wear ear plugs.  Instructed patient that she can take OTC pain relievers such as tylenol or IBU if she has a headache after today's treatment.  Reviewed safety concerns regarding sleep and use of illegal drugs/alcohol.        Patient was able to ask questions regarding procedure.  Patient informed of 10 minute late policy and attendance policy.  Consent was signed by patient today.

## 2025-02-09 ASSESSMENT — PATIENT HEALTH QUESTIONNAIRE - PHQ9
SUM OF ALL RESPONSES TO PHQ QUESTIONS 1-9: 24
SUM OF ALL RESPONSES TO PHQ QUESTIONS 1-9: 24
10. IF YOU CHECKED OFF ANY PROBLEMS, HOW DIFFICULT HAVE THESE PROBLEMS MADE IT FOR YOU TO DO YOUR WORK, TAKE CARE OF THINGS AT HOME, OR GET ALONG WITH OTHER PEOPLE: EXTREMELY DIFFICULT

## 2025-02-10 ENCOUNTER — OFFICE VISIT (OUTPATIENT)
Dept: PSYCHIATRY | Facility: CLINIC | Age: 25
End: 2025-02-10
Payer: COMMERCIAL

## 2025-02-10 DIAGNOSIS — F33.2 SEVERE EPISODE OF RECURRENT MAJOR DEPRESSIVE DISORDER, WITHOUT PSYCHOTIC FEATURES (H): Primary | ICD-10-CM

## 2025-02-10 ASSESSMENT — PATIENT HEALTH QUESTIONNAIRE - PHQ9
10. IF YOU CHECKED OFF ANY PROBLEMS, HOW DIFFICULT HAVE THESE PROBLEMS MADE IT FOR YOU TO DO YOUR WORK, TAKE CARE OF THINGS AT HOME, OR GET ALONG WITH OTHER PEOPLE: EXTREMELY DIFFICULT
SUM OF ALL RESPONSES TO PHQ QUESTIONS 1-9: 24
10. IF YOU CHECKED OFF ANY PROBLEMS, HOW DIFFICULT HAVE THESE PROBLEMS MADE IT FOR YOU TO DO YOUR WORK, TAKE CARE OF THINGS AT HOME, OR GET ALONG WITH OTHER PEOPLE: EXTREMELY DIFFICULT

## 2025-02-10 NOTE — PROGRESS NOTES
Interventional Psychiatry Program  [address], [address]  [place] [address]  TMS Procedure Note   Melinda Welch MRN# [id number]  Age: 24 year old year old YOB: 2000    Melinda Welch comes into clinic today at the request of Kateryna Boland MD Ordering Provider for TMS.    Pre-Procedure:  History and Physical: Reviewed in medical record  Consent Signed by: Melinda Welch for this course of treatment.  On: 02/06/2025    Clinical Narrative:  Patient tolerated treatment. Patient tolerated an increase to 120%. Had a quite weekend.    Indications for TMS:   MDD, recurrent, severe; 4+ medication trials (from 2+ classes) ineffective; Psychotherapy ineffective    Procedure Diagnosis:  No diagnosis found.    Treatment Hx:  Treatment number this series: 4  Total lifetime treatment number: 4    Allergies   Allergen Reactions    Seasonal Allergies       LMP 08/01/2024 (Within Days)     Pause for the Cause  Right patient: Yes  Right procedure/correct coil:  Yes; rTMS; cpt 25774; F8 coil.   Earplugs in place:  Yes    Procedure  Patient was seated in procedure chair. Identity and procedure was verified. Ear plugs were placed in ears and patient-specific cap was placed on head and tightened appropriately. Ruler locations were verified. Bone conducting headphones were not used.  Coil was placed at F3 and stimulator was set to 85% (120% of MT).  Initial train was well tolerated so 75 trains were delivered.  Patient tolerated procedure well with minimal right eyebrow movement.    Motor Threshold Determination  Distance from nasion to inion: 36cm  Tragus to tragus distance: 39cm  Head circumference: 59.5cm  Distance along the vertex: 9.83cm  Distance along circumference from midline: 6.84cm  Cap to Nasion: 8cm    MT 1: F3 @ 71% on 02/06/2025    Standard LDLPFC    Frequency: 10  Train Duration: 4  Total pulses delivered: 3000  Inter-train interval: 15  Tx Loc: F3  Energy: 85% (120%MT)  Trains: 75           2/7/2025     1:28 PM 2/9/2025     7:08 PM 2/10/2025     9:47 AM   PHQ-9 SCORE   PHQ-9 Total Score MyChart  24 (Severe depression) 24 (Severe depression)   PHQ-9 Total Score 25 24  24        Patient-reported       Date MADRS QIDS PHQ-9   02/06/25 34 22 24               Plan   - continue treatments    This service provided today was under the supervising provider of the day Clem Robles MD, who was available if needed.    Monserrat Padgett TMS Technician  [facility]  Mental [facility] Neuromodulation

## 2025-02-11 ENCOUNTER — TELEPHONE (OUTPATIENT)
Dept: FAMILY MEDICINE | Facility: CLINIC | Age: 25
End: 2025-02-11
Payer: COMMERCIAL

## 2025-02-11 ENCOUNTER — OFFICE VISIT (OUTPATIENT)
Dept: PSYCHIATRY | Facility: CLINIC | Age: 25
End: 2025-02-11
Payer: COMMERCIAL

## 2025-02-11 ENCOUNTER — VIRTUAL VISIT (OUTPATIENT)
Dept: PSYCHIATRY | Facility: CLINIC | Age: 25
End: 2025-02-11
Payer: COMMERCIAL

## 2025-02-11 DIAGNOSIS — F33.2 SEVERE EPISODE OF RECURRENT MAJOR DEPRESSIVE DISORDER, WITHOUT PSYCHOTIC FEATURES (H): Primary | ICD-10-CM

## 2025-02-11 ASSESSMENT — PATIENT HEALTH QUESTIONNAIRE - PHQ9
10. IF YOU CHECKED OFF ANY PROBLEMS, HOW DIFFICULT HAVE THESE PROBLEMS MADE IT FOR YOU TO DO YOUR WORK, TAKE CARE OF THINGS AT HOME, OR GET ALONG WITH OTHER PEOPLE: EXTREMELY DIFFICULT
SUM OF ALL RESPONSES TO PHQ QUESTIONS 1-9: 24

## 2025-02-11 NOTE — TELEPHONE ENCOUNTER
Relayed provider note. Pt requested note be sent in MyChart, nurse did just that.    Maren Saavedra RN on 2/11/2025 at 3:08 PM

## 2025-02-11 NOTE — TELEPHONE ENCOUNTER
Routing message to provider.    Patient calling to state she is now having a difficult time having a bowel movement since starting iron.    Her stool is green and hard .    Painful to go to the bathroom and sometimes sitting causes pain.    Gave home care advise of water, increased fiber, docusate, or miralax.    Patient wanted message sent to provider for her advise.    Christine M Klisch, RN

## 2025-02-11 NOTE — PROGRESS NOTES
Interventional Psychiatry Program  [address], [address]  [place] [address]  TMS Procedure Note   Melinda Welch MRN# [id number]  Age: 24 year old year old YOB: 2000    Melinda Welch comes into clinic today at the request of Kateryna Boland MD Ordering Provider for TMS.    Pre-Procedure:  History and Physical: Reviewed in medical record  Consent Signed by: Melinda Welch for this course of treatment.  On: 02/06/2025    Clinical Narrative:  Patient tolerated treatment. No plans for today.     Indications for TMS:   MDD, recurrent, severe; 4+ medication trials (from 2+ classes) ineffective; Psychotherapy ineffective    Procedure Diagnosis:  No diagnosis found.    Treatment Hx:  Treatment number this series: 5  Total lifetime treatment number: 5    Allergies   Allergen Reactions    Seasonal Allergies       LMP 08/01/2024 (Within Days)     Pause for the Cause  Right patient: Yes  Right procedure/correct coil:  Yes; rTMS; cpt 38380; F8 coil.   Earplugs in place:  Yes    Procedure  Patient was seated in procedure chair. Identity and procedure was verified. Ear plugs were placed in ears and patient-specific cap was placed on head and tightened appropriately. Ruler locations were verified. Bone conducting headphones were not used.  Coil was placed at F3 and stimulator was set to 85% (120% of MT).  Initial train was well tolerated so 75 trains were delivered.  Patient tolerated procedure well with minimal right eyebrow movement.    Motor Threshold Determination  Distance from nasion to inion: 36cm  Tragus to tragus distance: 39cm  Head circumference: 59.5cm  Distance along the vertex: 9.83cm  Distance along circumference from midline: 6.84cm  Cap to Nasion: 8cm    MT 1: F3 @ 71% on 02/06/2025    Standard LDLPFC    Frequency: 10  Train Duration: 4  Total pulses delivered: 3000  Inter-train interval: 15  Tx Loc: F3  Energy: 85% (120%MT)  Trains: 75          2/7/2025     1:28 PM 2/9/2025     7:08  PM 2/10/2025     9:47 AM   PHQ-9 SCORE   PHQ-9 Total Score MyChart  24 (Severe depression) 24 (Severe depression)   PHQ-9 Total Score 25 24  24        Patient-reported       Date MADRS QIDS PHQ-9   02/06/25 34 22 24               Plan   - continue treatments    This service provided today was under the supervising provider of the nazario Sam MD, who was available if needed.    Flora Jerez, TMS Technician  [facility] Neuromodulation

## 2025-02-11 NOTE — TELEPHONE ENCOUNTER
I agree with your suggestions. She can also take the iron supplement every other day to help with constipation. Here are some additional information if she is interested:    Constipation    1. Consume at least 8 glasses of water per day   2. Exercise for at least 30 minutes every day.   -Regular exercise helps to keep your digestive system active and healthy   3. Take advantage of opportunities   -You are more like to have a bowel movement after waking or eating. Do not postpone having a bowel movement if you feel the urge to go.   4. Increase dietary fiber.   -Goal of 25 grams per day for women, 35 grams per day for men. If unable to consume 25-35 grams through diet alone consider OTC supplements.   -Increase slowly, if taking too much too fast, may cause bloating and flatulence.   -Metamucil is a great choice. It requires the use of plenty of water to be effective. Can take days to weeks to take effect. Take up to 1 tablespoon (?3.5 grams fiber) 3 times per day of Metamucil    5. Prunes can be just as effective as Metamucil.   -10 prunes = 6 grams of fiber.   6. Recommend taking Miralax (17 grams = 1 scoop).   -Take once daily, can mix with anything. If you experience increasing bowel movements and diarrhea, decrease to every other day or every 3rd day. Miralax is an osmotic laxative that increases the amount of water secreted by the intestines resulting in softer and easier to pass stools. It can take 1-4 days to work. It may be taken chronically if you drink enough water throughout the day.   7. If Miralax isn't enough, recommend stimulant laxative such as Senna, Ex Lax or Dulcolax.   -Stimulant laxatives speed up the colonic motility of your gut helping to induce a bowel movement. Take as needed but should not be taken long term.

## 2025-02-11 NOTE — PROGRESS NOTES
"Clinician Contact & Progress Note  Department of Psychiatry & Behavioral Sciences  [facility]    Patient: Melinda Welch (2000)     MRN: [id number]  Date of Treatment:  Feb 4, 2025  Duration of Treatment: Start Time: 3:00 PM End Time: 4:00 PM  Provider: Rocio Iqbal M.A.  Supervisor: Abdirahman Senior, Ph.D., L.P.    People present:   Provider: Rocio Iqbal M.A.  Patient: Melinda Welch    Mode of communication: Virtual Visit Details    Type of service:  Video Visit   Video Start Time:  3:00 PM  Video End Time: 4:00 PM    Originating Location (pt. Location): Home  Distant Location (provider location):  On-site  Platform used for Video Visit: Instant BioScan     Diagnoses:  Encounter Diagnosis   Name Primary?    Severe episode of recurrent major depressive disorder, without psychotic features (H) Yes        Assessment (current symptoms):      2/7/2025     1:28 PM 2/9/2025     7:08 PM 2/10/2025     9:47 AM   PHQ   PHQ-9 Total Score 25 24  24    Q9: Thoughts of better off dead/self-harm past 2 weeks Nearly every day Nearly every day Nearly every day   F/U: Thoughts of suicide or self-harm  Yes Yes   F/U: Self harm-plan  No No   F/U: Self-harm action  No No   F/U: Safety concerns  No No       Patient-reported         12/15/2024     6:33 PM 1/18/2025     1:34 PM 2/4/2025     9:50 AM   RU-7 SCORE   Total Score 4 (minimal anxiety) 2 (minimal anxiety) 4 (minimal anxiety)   Total Score 4  2  4        Patient-reported       Session content:    Patient presented for initial psychotherapy visit with writer in Treatment Resistant Depression program. Began with confirmation of patient's identity with two sources of information. Writer reviewed issues of informed consent with patient, patient agreed verbally to continue with visit.    Asked to expound on their motivation for attending today's visit the patient noted recent worsening of depression, which pt described as \"I don't feel like my normal self, and " "I don't have the motivation to do certain things\", like perform self-care, get out of the house, and spend time with friends. Pt also reported depression causing low energy, decreased enjoyment of previously-enjoyed activities, and difficulty pursuing important personal goals.     Asked about their experience with previous and/or current psychotherapy the patient noted several hospitalizations for depression in the last year and participation in group DBT, which pt described as not a good personal fit. Pt also noted previous completion of ERP treatment for OCD symptoms, which they described as having helped, though some obsessive-compulsive symptoms have returned.     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     Treatment Plan/ Disposition:   Next appointment: Tuesday Feb 11, 2025  [place] with other (eg., psychiatric) treatment providers     Patient did not report any changes to medications      Mental Status Exam:  Alertness: alert  and oriented  Appearance: well groomed  Behavior/Demeanor: cooperative and pleasant, with good  eye contact   Speech: normal and regular rate and rhythm  Language: intact and no problems. Preferred language identified as English.  Psychomotor: normal or unremarkable  Mood: description consistent with euthymia  Affect: full range; was congruent to mood; was congruent to content  Thought Process/Associations: unremarkable  Thought Content:  Reports suicidal ideation without plan; without intent [details in Interim History];  Denies none  -Suicidal ideation: reports SI, denies intent, and denies plan on most recent PHQ-9  -Homicidal Ideation: does not report  Perception:  Reports none;  Denies none; intact  Insight: " "adequate  Judgment: fair  Cognition: does  appear grossly intact    Billing for \"Interactive Complexity\"?    No    Rocio Iqbal M.A.    Answers submitted by the patient for this visit:  Patient Health Questionnaire (Submitted on 2/4/2025)  If you checked off any problems, how difficult have these problems made it for you to do your work, take care of things at home, or get along with other people?: Extremely difficult  PHQ9 TOTAL SCORE: 22  Patient Health Questionnaire (G7) (Submitted on 2/4/2025)  RU 7 TOTAL SCORE: 4    "

## 2025-02-12 ENCOUNTER — OFFICE VISIT (OUTPATIENT)
Dept: PSYCHIATRY | Facility: CLINIC | Age: 25
End: 2025-02-12
Payer: COMMERCIAL

## 2025-02-12 DIAGNOSIS — F33.2 SEVERE EPISODE OF RECURRENT MAJOR DEPRESSIVE DISORDER, WITHOUT PSYCHOTIC FEATURES (H): Primary | ICD-10-CM

## 2025-02-12 NOTE — PROGRESS NOTES
Interventional Psychiatry Program  [address] [facility], [address]  [place] [address]  TMS Procedure Note   Melinda Welch MRN# [id number]  Age: 24 year old  YOB: 2000    Melinda Welch comes into clinic today at the request of, Kateryna Boland MD, ordering provider for TMS treatments.    Pre-Procedure:  History and Physical: Reviewed in medical record  Consent signed by: Melinda Welch for this course of treatment on: 02/06/2025    Clinical Narrative:  Patient tolerated treatment. No changes reported.     Indications for TMS:  MDD, recurrent, severe; 4+ medication trials (from 2+ classes) ineffective; Psychotherapy ineffective    Procedure Diagnosis:  MDD, severe, recurrent F33.2    Treatment Hx:  Treatment number this series: 6  Total lifetime treatment number: 6    Allergies   Allergen Reactions    Seasonal Allergies       LMP 08/01/2024 (Within Days)     Pause for the Cause  Right patient: Yes  Right procedure/correct coil:  Yes; rTMS; cpt 76082; F8 coil.   Earplugs in place:  Yes    Procedure  Patient was seated in procedure chair. Identity and procedure was verified. Ear plugs were placed in ears and patient-specific cap was placed on head and tightened appropriately. Ruler locations were verified. Bone conducting headphones were not used. Coil was placed at F3 and stimulator was set to 85% (120% of MT). Initial train was well tolerated so 75 trains were delivered. Patient tolerated procedure well with minimal right eyebrow movement.    Motor Threshold Determination  Distance from nasion to inion: 36cm  Tragus to tragus distance: 39cm  Head circumference: 59.5cm  Distance along the vertex: 9.83cm  Distance along circumference from midline: 6.84cm  Cap to Nasion: 8cm  MT 1: F3 @ 71% on 02/06/2025    Standard LDLPFC  Frequency: 10  Train Duration: 4  Total pulses delivered: 3000  Inter-train interval: 15  Tx Loc: F3  Energy: 85% (120%MT)  Trains: 75          2/10/2025     9:47 AM 2/11/2025      1:16 PM 2/11/2025     1:42 PM   PHQ-9 SCORE   PHQ-9 Total Score MyChart 24 (Severe depression)  24 (Severe depression)   PHQ-9 Total Score 24  24 24        Patient-reported       Date MADRS QIDS PHQ-9   02/06/25 34 22 24           Plan   - Continue TMS treatments    This service provided today was under the supervising provider of the day, Michael Child MD, who was available if needed.    Judie Garcia, TMS Technician  [facility] Neuromodulation

## 2025-02-13 ENCOUNTER — OFFICE VISIT (OUTPATIENT)
Dept: PSYCHIATRY | Facility: CLINIC | Age: 25
End: 2025-02-13
Payer: COMMERCIAL

## 2025-02-13 DIAGNOSIS — F33.2 SEVERE EPISODE OF RECURRENT MAJOR DEPRESSIVE DISORDER, WITHOUT PSYCHOTIC FEATURES (H): Primary | ICD-10-CM

## 2025-02-13 ASSESSMENT — PATIENT HEALTH QUESTIONNAIRE - PHQ9
SUM OF ALL RESPONSES TO PHQ QUESTIONS 1-9: 26
10. IF YOU CHECKED OFF ANY PROBLEMS, HOW DIFFICULT HAVE THESE PROBLEMS MADE IT FOR YOU TO DO YOUR WORK, TAKE CARE OF THINGS AT HOME, OR GET ALONG WITH OTHER PEOPLE: EXTREMELY DIFFICULT
SUM OF ALL RESPONSES TO PHQ QUESTIONS 1-9: 26

## 2025-02-13 NOTE — PROGRESS NOTES
Interventional Psychiatry Program  [address], [address]  [place] [address]  TMS Procedure Note   Melinda Welch MRN# [id number]  Age: 24 year old  YOB: 2000    Melinda Welch comes into clinic today at the request of, Kateryna Boland MD, ordering provider for TMS treatments.    Pre-Procedure:  History and Physical: Reviewed in medical record  Consent signed by: Melinda Welch for this course of treatment on: 02/06/2025    Clinical Narrative:  Patient tolerated treatment. No changes reported.     Indications for TMS:  MDD, recurrent, severe; 4+ medication trials (from 2+ classes) ineffective; Psychotherapy ineffective    Procedure Diagnosis:  MDD, severe, recurrent F33.2    Treatment Hx:  Treatment number this series: 7  Total lifetime treatment number: 7    Allergies   Allergen Reactions    Seasonal Allergies       LMP 08/01/2024 (Within Days)     Pause for the Cause  Right patient: Yes  Right procedure/correct coil:  Yes; rTMS; cpt 14955; F8 coil.   Earplugs in place:  Yes    Procedure  Patient was seated in procedure chair. Identity and procedure was verified. Ear plugs were placed in ears and patient-specific cap was placed on head and tightened appropriately. Ruler locations were verified. Bone conducting headphones were not used. Coil was placed at F3 and stimulator was set to 85% (120% of MT). Initial train was well tolerated so 75 trains were delivered. Patient tolerated procedure well with minimal right eyebrow movement.    Motor Threshold Determination  Distance from nasion to inion: 36cm  Tragus to tragus distance: 39cm  Head circumference: 59.5cm  Distance along the vertex: 9.83cm  Distance along circumference from midline: 6.84cm  Cap to Nasion: 8cm  MT 1: F3 @ 71% on 02/06/2025    Standard LDLPFC  Frequency: 10  Train Duration: 4  Total pulses delivered: 3000  Inter-train interval: 15  Tx Loc: F3  Energy: 85% (120%MT)  Trains: 75          2/11/2025     1:16 PM 2/11/2025      1:42 PM 2/13/2025     2:24 AM   PHQ-9 SCORE   PHQ-9 Total Score MyChart  24 (Severe depression) 26 (Severe depression)   PHQ-9 Total Score 24 24  26        Patient-reported       Date MADRS QIDS PHQ-9   02/06/25 34 22 24           Plan   - Continue TMS treatments    This service provided today was under the supervising provider of the day, NAT Sam MD, who was available if needed.    Monserrat Padgett TMS Technician  [facility] Neuromod[facility]

## 2025-02-17 ENCOUNTER — OFFICE VISIT (OUTPATIENT)
Dept: PSYCHIATRY | Facility: CLINIC | Age: 25
End: 2025-02-17
Payer: COMMERCIAL

## 2025-02-17 DIAGNOSIS — F33.2 SEVERE EPISODE OF RECURRENT MAJOR DEPRESSIVE DISORDER, WITHOUT PSYCHOTIC FEATURES (H): Primary | ICD-10-CM

## 2025-02-17 ASSESSMENT — PATIENT HEALTH QUESTIONNAIRE - PHQ9
SUM OF ALL RESPONSES TO PHQ QUESTIONS 1-9: 27
10. IF YOU CHECKED OFF ANY PROBLEMS, HOW DIFFICULT HAVE THESE PROBLEMS MADE IT FOR YOU TO DO YOUR WORK, TAKE CARE OF THINGS AT HOME, OR GET ALONG WITH OTHER PEOPLE: EXTREMELY DIFFICULT
SUM OF ALL RESPONSES TO PHQ QUESTIONS 1-9: 27
SUM OF ALL RESPONSES TO PHQ QUESTIONS 1-9: 27
10. IF YOU CHECKED OFF ANY PROBLEMS, HOW DIFFICULT HAVE THESE PROBLEMS MADE IT FOR YOU TO DO YOUR WORK, TAKE CARE OF THINGS AT HOME, OR GET ALONG WITH OTHER PEOPLE: EXTREMELY DIFFICULT
10. IF YOU CHECKED OFF ANY PROBLEMS, HOW DIFFICULT HAVE THESE PROBLEMS MADE IT FOR YOU TO DO YOUR WORK, TAKE CARE OF THINGS AT HOME, OR GET ALONG WITH OTHER PEOPLE: EXTREMELY DIFFICULT

## 2025-02-17 NOTE — PROGRESS NOTES
Interventional Psychiatry Program  [address] [facility], [address]  [place] [address]  TMS Procedure Note   Melinda Welch MRN# [id number]  Age: 24 year old  YOB: 2000    Melinda Welch comes into clinic today at the request of, Kateryna Boland MD, ordering provider for TMS treatments.    Pre-Procedure:  History and Physical: Reviewed in medical record  Consent signed by: Melinda Welch for this course of treatment on: 02/06/2025    Clinical Narrative:  Patient tolerated treatment. Had a relaxing weekend.     Indications for TMS:  MDD, recurrent, severe; 4+ medication trials (from 2+ classes) ineffective; Psychotherapy ineffective    Procedure Diagnosis:  MDD, severe, recurrent F33.2    Treatment Hx:  Treatment number this series: 9  Total lifetime treatment number: 9    Allergies   Allergen Reactions    Seasonal Allergies       LMP 08/01/2024 (Within Days)     Pause for the Cause  Right patient: Yes  Right procedure/correct coil:  Yes; rTMS; cpt 28353; F8 coil.   Earplugs in place:  Yes    Procedure  Patient was seated in procedure chair. Identity and procedure was verified. Ear plugs were placed in ears and patient-specific cap was placed on head and tightened appropriately. Ruler locations were verified. Bone conducting headphones were not used. Coil was placed at F3 and stimulator was set to 85% (120% of MT). Initial train was well tolerated so 75 trains were delivered. Patient tolerated procedure well with minimal right eyebrow movement.    Motor Threshold Determination  Distance from nasion to inion: 36cm  Tragus to tragus distance: 39cm  Head circumference: 59.5cm  Distance along the vertex: 9.83cm  Distance along circumference from midline: 6.84cm  Cap to Nasion: 8cm  MT 1: F3 @ 71% on 02/06/2025    Standard LDLPFC  Frequency: 10  Train Duration: 4  Total pulses delivered: 3000  Inter-train interval: 15  Tx Loc: F3  Energy: 85% (120%MT)  Trains: 75          2/11/2025     1:42 PM 2/13/2025      2:24 AM 2/17/2025     8:57 AM   PHQ-9 SCORE   PHQ-9 Total Score MyChart 24 (Severe depression) 26 (Severe depression) 27 (Severe depression)   PHQ-9 Total Score 24  26  27        Patient-reported       Date MADRS QIDS PHQ-9   02/06/25 34 22 24   2/14/25  24 26     Plan   - Continue TMS treatments    This service provided today was under the supervising provider of the day, Kateryna Boland MD, who was available if needed.    Judie Garcia, TMS Technician  [facility] Neuromod[facility]

## 2025-02-18 ENCOUNTER — OFFICE VISIT (OUTPATIENT)
Dept: PSYCHIATRY | Facility: CLINIC | Age: 25
End: 2025-02-18
Payer: COMMERCIAL

## 2025-02-18 ENCOUNTER — VIRTUAL VISIT (OUTPATIENT)
Dept: PSYCHIATRY | Facility: CLINIC | Age: 25
End: 2025-02-18
Payer: COMMERCIAL

## 2025-02-18 DIAGNOSIS — F33.2 SEVERE EPISODE OF RECURRENT MAJOR DEPRESSIVE DISORDER, WITHOUT PSYCHOTIC FEATURES (H): Primary | ICD-10-CM

## 2025-02-18 ASSESSMENT — ANXIETY QUESTIONNAIRES
GAD7 TOTAL SCORE: 3
3. WORRYING TOO MUCH ABOUT DIFFERENT THINGS: NOT AT ALL
4. TROUBLE RELAXING: SEVERAL DAYS
GAD7 TOTAL SCORE: 3
6. BECOMING EASILY ANNOYED OR IRRITABLE: NOT AT ALL
GAD7 TOTAL SCORE: 3
7. FEELING AFRAID AS IF SOMETHING AWFUL MIGHT HAPPEN: SEVERAL DAYS
2. NOT BEING ABLE TO STOP OR CONTROL WORRYING: NOT AT ALL
GAD7 TOTAL SCORE: 3
1. FEELING NERVOUS, ANXIOUS, OR ON EDGE: SEVERAL DAYS
8. IF YOU CHECKED OFF ANY PROBLEMS, HOW DIFFICULT HAVE THESE MADE IT FOR YOU TO DO YOUR WORK, TAKE CARE OF THINGS AT HOME, OR GET ALONG WITH OTHER PEOPLE?: SOMEWHAT DIFFICULT
2. NOT BEING ABLE TO STOP OR CONTROL WORRYING: NOT AT ALL
4. TROUBLE RELAXING: SEVERAL DAYS
GAD7 TOTAL SCORE: 3
3. WORRYING TOO MUCH ABOUT DIFFERENT THINGS: NOT AT ALL
5. BEING SO RESTLESS THAT IT IS HARD TO SIT STILL: NOT AT ALL
IF YOU CHECKED OFF ANY PROBLEMS ON THIS QUESTIONNAIRE, HOW DIFFICULT HAVE THESE PROBLEMS MADE IT FOR YOU TO DO YOUR WORK, TAKE CARE OF THINGS AT HOME, OR GET ALONG WITH OTHER PEOPLE: SOMEWHAT DIFFICULT
6. BECOMING EASILY ANNOYED OR IRRITABLE: NOT AT ALL
7. FEELING AFRAID AS IF SOMETHING AWFUL MIGHT HAPPEN: SEVERAL DAYS
7. FEELING AFRAID AS IF SOMETHING AWFUL MIGHT HAPPEN: SEVERAL DAYS
8. IF YOU CHECKED OFF ANY PROBLEMS, HOW DIFFICULT HAVE THESE MADE IT FOR YOU TO DO YOUR WORK, TAKE CARE OF THINGS AT HOME, OR GET ALONG WITH OTHER PEOPLE?: SOMEWHAT DIFFICULT
1. FEELING NERVOUS, ANXIOUS, OR ON EDGE: SEVERAL DAYS
GAD7 TOTAL SCORE: 3
5. BEING SO RESTLESS THAT IT IS HARD TO SIT STILL: NOT AT ALL
7. FEELING AFRAID AS IF SOMETHING AWFUL MIGHT HAPPEN: SEVERAL DAYS
IF YOU CHECKED OFF ANY PROBLEMS ON THIS QUESTIONNAIRE, HOW DIFFICULT HAVE THESE PROBLEMS MADE IT FOR YOU TO DO YOUR WORK, TAKE CARE OF THINGS AT HOME, OR GET ALONG WITH OTHER PEOPLE: SOMEWHAT DIFFICULT

## 2025-02-18 ASSESSMENT — PATIENT HEALTH QUESTIONNAIRE - PHQ9
10. IF YOU CHECKED OFF ANY PROBLEMS, HOW DIFFICULT HAVE THESE PROBLEMS MADE IT FOR YOU TO DO YOUR WORK, TAKE CARE OF THINGS AT HOME, OR GET ALONG WITH OTHER PEOPLE: EXTREMELY DIFFICULT
SUM OF ALL RESPONSES TO PHQ QUESTIONS 1-9: 27

## 2025-02-18 NOTE — PROGRESS NOTES
Interventional Psychiatry Program  [address] [facility], [address]  [place] [address]  TMS Procedure Note   Melinda Welch MRN# [id number]  Age: 24 year old  YOB: 2000    Melinda Welch comes into clinic today at the request of, Kateryna Boland MD, ordering provider for TMS treatments.    Pre-Procedure:  History and Physical: Reviewed in medical record  Consent signed by: Melinda Welch for this course of treatment on: 02/06/2025    Clinical Narrative:  Patient tolerated treatment. Started watching love is blind.    Indications for TMS:  MDD, recurrent, severe; 4+ medication trials (from 2+ classes) ineffective; Psychotherapy ineffective    Procedure Diagnosis:  MDD, severe, recurrent F33.2    Treatment Hx:  Treatment number this series: 10  Total lifetime treatment number: 10    Allergies   Allergen Reactions    Seasonal Allergies       LMP 08/01/2024 (Within Days)     Pause for the Cause  Right patient: Yes  Right procedure/correct coil:  Yes; rTMS; cpt 65997; F8 coil.   Earplugs in place:  Yes    Procedure  Patient was seated in procedure chair. Identity and procedure was verified. Ear plugs were placed in ears and patient-specific cap was placed on head and tightened appropriately. Ruler locations were verified. Bone conducting headphones were not used. Coil was placed at F3 and stimulator was set to 85% (120% of MT). Initial train was well tolerated so 75 trains were delivered. Patient tolerated procedure well with minimal right eyebrow movement.    Motor Threshold Determination  Distance from nasion to inion: 36cm  Tragus to tragus distance: 39cm  Head circumference: 59.5cm  Distance along the vertex: 9.83cm  Distance along circumference from midline: 6.84cm  Cap to Nasion: 8cm  MT 1: F3 @ 71% on 02/06/2025    Standard LDLPFC  Frequency: 10  Train Duration: 4  Total pulses delivered: 3000  Inter-train interval: 15  Tx Loc: F3  Energy: 85% (120%MT)  Trains: 75          2/17/2025     8:57 AM  2/18/2025    12:39 PM 2/18/2025     1:30 PM   PHQ-9 SCORE   PHQ-9 Total Score MyChart 27 (Severe depression) 27 (Severe depression)    PHQ-9 Total Score 27  27  27       Patient-reported       Date MADRS QIDS PHQ-9   02/06/25 34 22 24   2/14/25  24 26     Plan   - Continue TMS treatments    This service provided today was under the supervising provider of the day, NAT Sam MD, who was available if needed.    Flora Jerez, TMS Technician  [facility] Neuromod[facility]

## 2025-02-19 ENCOUNTER — TELEPHONE (OUTPATIENT)
Dept: FAMILY MEDICINE | Facility: CLINIC | Age: 25
End: 2025-02-19
Payer: COMMERCIAL

## 2025-02-19 ENCOUNTER — OFFICE VISIT (OUTPATIENT)
Dept: PSYCHIATRY | Facility: CLINIC | Age: 25
End: 2025-02-19
Payer: COMMERCIAL

## 2025-02-19 DIAGNOSIS — F33.2 SEVERE EPISODE OF RECURRENT MAJOR DEPRESSIVE DISORDER, WITHOUT PSYCHOTIC FEATURES (H): Primary | ICD-10-CM

## 2025-02-19 DIAGNOSIS — E28.2 PCOS (POLYCYSTIC OVARIAN SYNDROME): Primary | ICD-10-CM

## 2025-02-19 RX ORDER — METFORMIN HYDROCHLORIDE 500 MG/1
500 TABLET, EXTENDED RELEASE ORAL 2 TIMES DAILY WITH MEALS
Qty: 90 TABLET | Refills: 2 | Status: SHIPPED | OUTPATIENT
Start: 2025-02-19

## 2025-02-19 NOTE — PROGRESS NOTES
Interventional Psychiatry Program  [address] [facility] [address], [address]  [place] [address]  TMS Procedure Note   Melinda Welch MRN# [id number]  Age: 24 year old  YOB: 2000    Melinda Welch comes into clinic today at the request of, Kateryna Boland MD, ordering provider for TMS treatments.    Pre-Procedure:  History and Physical: Reviewed in medical record  Consent signed by: Melinda Welch for this course of treatment on: 02/06/2025    Clinical Narrative:  Patient tolerated treatment. Listened to music.    Indications for TMS:  MDD, recurrent, severe; 4+ medication trials (from 2+ classes) ineffective; Psychotherapy ineffective    Procedure Diagnosis:  MDD, severe, recurrent F33.2    Treatment Hx:  Treatment number this series: 11  Total lifetime treatment number: 11    Allergies   Allergen Reactions    Seasonal Allergies       LMP 08/01/2024 (Within Days)     Pause for the Cause  Right patient: Yes  Right procedure/correct coil:  Yes; rTMS; cpt 95071; F8 coil.   Earplugs in place:  Yes    Procedure  Patient was seated in procedure chair. Identity and procedure was verified. Ear plugs were placed in ears and patient-specific cap was placed on head and tightened appropriately. Ruler locations were verified. Bone conducting headphones were not used. Coil was placed at F3 and stimulator was set to 85% (120% of MT). Initial train was well tolerated so 75 trains were delivered. Patient tolerated procedure well with minimal right eyebrow movement.    Motor Threshold Determination  Distance from nasion to inion: 36cm  Tragus to tragus distance: 39cm  Head circumference: 59.5cm  Distance along the vertex: 9.83cm  Distance along circumference from midline: 6.84cm  Cap to Nasion: 8cm  MT 1: F3 @ 71% on 02/06/2025    Standard LDLPFC  Frequency: 10  Train Duration: 4  Total pulses delivered: 3000  Inter-train interval: 15  Tx Loc: F3  Energy: 85% (120%MT)  Trains: 75          2/17/2025     8:57 AM 2/18/2025     12:39 PM 2/18/2025     1:30 PM   PHQ-9 SCORE   PHQ-9 Total Score MyChart 27 (Severe depression) 27 (Severe depression)    PHQ-9 Total Score 27  27  27       Patient-reported       Date MADRS QIDS PHQ-9   02/06/25 34 22 24   2/14/25  24 26     Plan   - Continue TMS treatments    This service provided today was under the supervising provider of the day, Michael Child MD, who was available if needed.    Monserrat Padgett TMS Technician  [facility]romod[facility]

## 2025-02-19 NOTE — TELEPHONE ENCOUNTER
Medication Question or Refill    Contacts       Contact Date/Time Type Contact Phone/Fax    02/19/2025 11:53 AM CST Phone (Incoming) Melinda Welch (Self) [phone] (M)            What medication are you calling about (include dose and sig)?: metFORMIN (GLUCOPHAGE) 500mg one pill twice a day    Preferred Pharmacy:   [id number] - [place], [address] [place] AT [address] [place] [address]  Phone: [phone] Fax: [phone]    Controlled Substance Agreement on file:   CSA -- Patient Level:    CSA: None found at the patient level.       Who prescribed the medication?: a provider with [facility]    Do you need a refill? Yes    When did you use the medication last? today    Patient offered an appointment? No    Do you have any questions or concerns?  Yes: Patient has two more tablets left and is asking if Celena can refill this medication for her    Could we send this information to you in [place] or would you prefer to receive a phone call?:   Patient would prefer a phone call   Okay to leave a detailed message?: Yes at Cell number on file:    Telephone Information:   Mobile [phone]     Tash Vasquez,

## 2025-02-20 ENCOUNTER — OFFICE VISIT (OUTPATIENT)
Dept: PSYCHIATRY | Facility: CLINIC | Age: 25
End: 2025-02-20
Payer: COMMERCIAL

## 2025-02-20 DIAGNOSIS — F33.2 SEVERE EPISODE OF RECURRENT MAJOR DEPRESSIVE DISORDER, WITHOUT PSYCHOTIC FEATURES (H): Primary | ICD-10-CM

## 2025-02-20 ASSESSMENT — PATIENT HEALTH QUESTIONNAIRE - PHQ9
SUM OF ALL RESPONSES TO PHQ QUESTIONS 1-9: 27
SUM OF ALL RESPONSES TO PHQ QUESTIONS 1-9: 26
10. IF YOU CHECKED OFF ANY PROBLEMS, HOW DIFFICULT HAVE THESE PROBLEMS MADE IT FOR YOU TO DO YOUR WORK, TAKE CARE OF THINGS AT HOME, OR GET ALONG WITH OTHER PEOPLE: EXTREMELY DIFFICULT
SUM OF ALL RESPONSES TO PHQ QUESTIONS 1-9: 26
SUM OF ALL RESPONSES TO PHQ QUESTIONS 1-9: 26

## 2025-02-20 NOTE — PROGRESS NOTES
Interventional Psychiatry Program  [address] [facility], [address]  [place] [address]  TMS Procedure Note   Melinda Welch MRN# [id number]  Age: 24 year old  YOB: 2000    Melinda Welch comes into clinic today at the request of, Kateryna Boland MD, ordering provider for TMS treatments.    Pre-Procedure:  History and Physical: Reviewed in medical record  Consent signed by: Melinda Welch for this course of treatment on: 02/06/2025    Clinical Narrative:  Patient tolerated treatment. Plans to watch love is blind this weekend.    Indications for TMS:  MDD, recurrent, severe; 4+ medication trials (from 2+ classes) ineffective; Psychotherapy ineffective    Procedure Diagnosis:  MDD, severe, recurrent F33.2    Treatment Hx:  Treatment number this series: 12  Total lifetime treatment number: 12    Allergies   Allergen Reactions    Seasonal Allergies       LMP 08/01/2024 (Within Days)     Pause for the Cause  Right patient: Yes  Right procedure/correct coil:  Yes; rTMS; cpt 18531; F8 coil.   Earplugs in place:  Yes    Procedure  Patient was seated in procedure chair. Identity and procedure was verified. Ear plugs were placed in ears and patient-specific cap was placed on head and tightened appropriately. Ruler locations were verified. Bone conducting headphones were not used. Coil was placed at F3 and stimulator was set to 85% (120% of MT). Initial train was well tolerated so 75 trains were delivered. Patient tolerated procedure well with minimal right eyebrow movement.    Motor Threshold Determination  Distance from nasion to inion: 36cm  Tragus to tragus distance: 39cm  Head circumference: 59.5cm  Distance along the vertex: 9.83cm  Distance along circumference from midline: 6.84cm  Cap to Nasion: 8cm  MT 1: F3 @ 71% on 02/06/2025    Standard LDLPFC  Frequency: 10  Train Duration: 4  Total pulses delivered: 3000  Inter-train interval: 15  Tx Loc: F3  Energy: 85% (120%MT)  Trains: 75          2/18/2025      1:30 PM 2/19/2025     1:28 PM 2/20/2025    10:28 AM   PHQ-9 SCORE   PHQ-9 Total Score MyChart   26 (Severe depression)   PHQ-9 Total Score 27 26 26        Patient-reported       Date MADRS QIDS PHQ-9   02/06/25 34 22 24   2/14/25  24 26     Plan   - Continue TMS treatments    This service provided today was under the supervising provider of the day, NAT Sam MD, who was available if needed.    Judie Garcia, TMS Technician  [facility] Neuromod[facility]

## 2025-02-20 NOTE — PROGRESS NOTES
"Clinician Contact & Progress Note  Department of Psychiatry & Behavioral Sciences  [facility]    Patient: Melinda Welch (2000)     MRN: [id number]  Date of Treatment:  Feb 11, 2025  Duration of Treatment: Start Time: 3:30 PM End Time: 4:20 PM  Provider: Rocio Iqbal M.A.  Supervisor: Abdirahman Senior, Ph.D., L.P.    People present:   Provider: Rocio Iqbal M.A.  Patient: Melinda Welch    Mode of communication: Virtual Visit Details    Type of service:  Video Visit   Video Start Time:  3:30 PM  Video End Time: 4:20 PM    Originating Location (pt. Location): Home  Distant Location (provider location):  On-site  Platform used for Video Visit: CitalDoc       Diagnoses:   Encounter Diagnosis   Name Primary?    Severe episode of recurrent major depressive disorder, without psychotic features (H) Yes        Assessment (current symptoms):      2/19/2025     1:28 PM 2/20/2025    10:28 AM 2/20/2025     1:27 PM   PHQ   PHQ-9 Total Score 26 26  27   Q9: Thoughts of better off dead/self-harm past 2 weeks Nearly every day Nearly every day Nearly every day   F/U: Thoughts of suicide or self-harm  Yes    F/U: Self harm-plan  No    F/U: Self-harm action  No    F/U: Safety concerns  No        Patient-reported         1/18/2025     1:34 PM 2/4/2025     9:50 AM 2/18/2025    12:38 PM   RU-7 SCORE   Total Score 2 (minimal anxiety) 4 (minimal anxiety) 3 (minimal anxiety)   Total Score 2  4  3        Patient-reported     Session content: Pt presented for follow-up in Treatment Resistant Depression clinic for psychotherapy visit with writer. Today focused on further assessing what was helpful/unhelpful previous therapy experiences and explaining rationale of Behavioral Activation treatment. Pt reported previously having completed a partial course of Behavioral Activation while in residential treatment and finding it helpful, but felt this course of BA was \"rushed\" and that long-term maintenance of behavioral " "changes was challenging. Pt additionally described therapy as having been most helpful when teaching concrete coping skills. Therapist and pt then discussed treatment rationale of BA, differentiating healthy and unhealthy behaviors, before discussing variables on weekly self-monitoring form. Therapist & pt agreed upon weekly self-monitoring as between-session homework for next session.     Treatment strategies:   Discussed styles of therapy that pt has previously found helpful/unhelpful  Provided psychoeducation and further explanation of treatment rationale and structure of BA therapy  Practiced empathetic listening and validation techniques  Assigned weekly Self-Monitoring as between-session homework    Homework:  1 week of daily behavioral self-monitoring     Response to treatment:  Pt progress towards goals has been N/A, recent onset of treatment    Treatment Plan/ Disposition:   Continue weekly BA with writer  Next appointment: Feb 18, 2025  [place] with other (eg., psychiatric) treatment providers    Patient did not report any changes to medications      Mental Status Exam:  Alertness: alert  and oriented  Appearance: adequately groomed  Behavior/Demeanor: cooperative, with good  eye contact   Speech: normal and regular rate and rhythm  Language: intact and no problems. Preferred language identified as English.  Psychomotor: normal or unremarkable  Mood: description consistent with euthymia  Affect: full range; was congruent to mood; was congruent to content  Thought Process/Associations: unremarkable  Thought Content:  Reports none  -Suicidal ideation: does not report  -Homicidal Ideation: does not report  Perception:  Reports none;  Denies none; intact  Insight: adequate  Judgment: fair  Cognition: does  appear grossly intact    Billing for \"Interactive Complexity\"?    No    Rocio Iqbal M.A.    Answers submitted by the patient for this visit:  Patient Health Questionnaire (Submitted on 2/10/2025)  If you " checked off any problems, how difficult have these problems made it for you to do your work, take care of things at home, or get along with other people?: Extremely difficult  PHQ9 TOTAL SCORE: 24

## 2025-02-22 ENCOUNTER — THERAPY VISIT (OUTPATIENT)
Dept: PHYSICAL THERAPY | Facility: CLINIC | Age: 25
End: 2025-02-22
Payer: COMMERCIAL

## 2025-02-22 DIAGNOSIS — G89.29 CHRONIC BILATERAL LOW BACK PAIN WITHOUT SCIATICA: ICD-10-CM

## 2025-02-22 DIAGNOSIS — M54.50 CHRONIC BILATERAL LOW BACK PAIN WITHOUT SCIATICA: ICD-10-CM

## 2025-02-22 PROCEDURE — 97110 THERAPEUTIC EXERCISES: CPT | Mod: GP

## 2025-02-22 PROCEDURE — 97161 PT EVAL LOW COMPLEX 20 MIN: CPT | Mod: GP

## 2025-02-22 PROCEDURE — 97530 THERAPEUTIC ACTIVITIES: CPT | Mod: GP

## 2025-02-22 NOTE — PROGRESS NOTES
PHYSICAL THERAPY EVALUATION  Type of Visit: Evaluation              Subjective         Presenting condition or subjective complaint: Lower back, heaviness in legs from back, shoulders,neck,arms,hands,knees,and feet  Has had back pain since 17 and got worse after a fall on ice last year. Notices the pain more so if she walks too long - legs will start to feel heavy. This occurs after an hour of walking. Sits down and will take about a couple hours to calm back down. Denies numbness or tingling.    Date of onset: 01/29/25 (date of order)    Relevant medical history: Depression; Diabetes; Mental Illness; Migraines or headaches; Overweight   No past medical history on file.  Dates & types of surgery:    No past surgical history on file.  Prior diagnostic imaging/testing results: X-ray     Prior therapy history for the same diagnosis, illness or injury: Yes Physcial therapy    Prior Level of Function  Transfers: Independent  Ambulation: Independent  ADL: Independent  IADL:     Living Environment  Social support: With family members   Type of home: House   Stairs to enter the home: No       Ramp: No   Stairs inside the home: Yes 12 Is there a railing: Yes     Help at home: Home management tasks (cooking, cleaning); Medication and/or finances; Assist for driving and community activities  Equipment owned:       Employment: No    Hobbies/Interests: Concerts,traveling,hiking,walking,    Patient goals for therapy: Walk and exercise for a long distance    Pain assessment: Pain present     Objective   LUMBAR SPINE EVALUATION  PAIN: Pain Level at Rest: 2/10  Pain Level with Use: 5/10  Pain Location: lumbar spine and bilateral LE; L Low back is more tight  Pain Quality: Sharp and tightness, heaviness in legs  POSTURE: WFL  GAIT:   Weightbearing Status:  normal  Assistive Device(s): None  Gait Deviations:  slight trendelenburg L>R side  ROM: AROM WNL  PROM hips: WNL B flexion, IR and ER  STRENGTH:  hip flex: 5 B, hip abd: 4- B, hip  ext: 3+ R side, 4 L side  FLEXIBILITY:  heel to glute WNL B  FUNCTIONAL TESTS: Double Leg Squat: Improper use of glutes/hips increased lumbar lordosis  PALPATION:  not assessed    Assessment & Plan   CLINICAL IMPRESSIONS  Medical Diagnosis: bilateral LBP    Treatment Diagnosis: bilateral LBP   Impression/Assessment: Patient is a 24 year old female with LB complaints.  The following significant findings have been identified: Pain, Decreased strength, Impaired gait, Impaired muscle performance, Decreased activity tolerance, and Impaired posture. These impairments interfere with their ability to perform self care tasks, recreational activities, household chores, household mobility, and community mobility as compared to previous level of function.     Clinical Decision Making (Complexity):  Clinical Presentation: Stable/Uncomplicated  Clinical Presentation Rationale: based on medical and personal factors listed in PT evaluation  Clinical Decision Making (Complexity): Low complexity    PLAN OF CARE  Treatment Interventions:  Modalities: E-stim  Interventions: Gait Training, Manual Therapy, Neuromuscular Re-education, Therapeutic Activity, Therapeutic Exercise, Self-Care/Home Management    Long Term Goals     PT Goal 1  Goal Identifier: ambulation  Goal Description: patient will be able to walk for 60 min with 2/10 pain or less and 50% reduction in LE heaviness  Rationale: to maximize safety and independence with performance of ADLs and functional tasks;to maximize safety and independence within the home;to maximize safety and independence with self cares  Target Date: 05/17/25      Frequency of Treatment: 1x/wk progressing to 1 x every other week  Duration of Treatment: 2-3 months      Recommended Referrals to Other Professionals: n/a at present  Education Assessment:   Learner/Method: Patient    Risks and benefits of evaluation/treatment have been explained.   Patient/Family/caregiver agrees with Plan of Care.      Evaluation Time:     PT Eval, Low Complexity Minutes (72720): 10     Signing Clinician: RADHA Boswell [facility]                                                                                   OUTPATIENT PHYSICAL THERAPY      PLAN OF TREATMENT FOR OUTPATIENT REHABILITATION   Patient's Last Name, First Name, SAGARANIA HusseineyMelinda  GEETA YOB: 2000   Provider's Name   [facility]   Medical Record No.  [id number]     Onset Date: 01/29/25 (date of order)  Start of Care Date: 02/22/25     Medical Diagnosis:  bilateral LBP      PT Treatment Diagnosis:  bilateral LBP Plan of Treatment  Frequency/Duration: 1x/wk progressing to 1 x every other week/ 2-3 months    Certification date from 02/22/25 to 05/17/25         See note for plan of treatment details and functional goals     Erica No, PT                         I CERTIFY THE NEED FOR THESE SERVICES FURNISHED UNDER        THIS PLAN OF TREATMENT AND WHILE UNDER MY CARE     (Physician attestation of this document indicates review and certification of the therapy plan).              Referring Provider:  Celena Suarez    Initial Assessment  See Epic Evaluation- Start of Care Date: 02/22/25

## 2025-02-24 ENCOUNTER — OFFICE VISIT (OUTPATIENT)
Dept: PSYCHIATRY | Facility: CLINIC | Age: 25
End: 2025-02-24
Payer: COMMERCIAL

## 2025-02-24 DIAGNOSIS — F33.2 SEVERE EPISODE OF RECURRENT MAJOR DEPRESSIVE DISORDER, WITHOUT PSYCHOTIC FEATURES (H): Primary | ICD-10-CM

## 2025-02-24 ASSESSMENT — PATIENT HEALTH QUESTIONNAIRE - PHQ9

## 2025-02-24 NOTE — PROGRESS NOTES
Interventional Psychiatry Program  [address] [facility], [address]  [place] [address]  TMS Procedure Note   Melinda Welch MRN# [id number]  Age: 24 year old  YOB: 2000    Melinda Welch comes into clinic today at the request of, Kateryna Boland MD, ordering provider for TMS treatments.    Pre-Procedure:  History and Physical: Reviewed in medical record  Consent signed by: Melinda Welch for this course of treatment on: 02/06/2025    Clinical Narrative:  Patient tolerated treatment. No changes reported.    Indications for TMS:  MDD, recurrent, severe; 4+ medication trials (from 2+ classes) ineffective; Psychotherapy ineffective    Procedure Diagnosis:  MDD, severe, recurrent F33.2    Treatment Hx:  Treatment number this series: 14  Total lifetime treatment number: 14    Allergies   Allergen Reactions    Seasonal Allergies       LMP 08/01/2024 (Within Days)     Pause for the Cause  Right patient: Yes  Right procedure/correct coil:  Yes; rTMS; cpt 23721; F8 coil.   Earplugs in place:  Yes    Procedure  Patient was seated in procedure chair. Identity and procedure was verified. Ear plugs were placed in ears and patient-specific cap was placed on head and tightened appropriately. Ruler locations were verified. Bone conducting headphones were not used. Coil was placed at F3 and stimulator was set to 85% (120% of MT). Initial train was well tolerated so 75 trains were delivered. Patient tolerated procedure well with minimal right eyebrow movement.    Motor Threshold Determination  Distance from nasion to inion: 36cm  Tragus to tragus distance: 39cm  Head circumference: 59.5cm  Distance along the vertex: 9.83cm  Distance along circumference from midline: 6.84cm  Cap to Nasion: 8cm  MT 1: F3 @ 71% on 02/06/2025    Standard LDLPFC  Frequency: 10  Train Duration: 4  Total pulses delivered: 3000  Inter-train interval: 15  Tx Loc: F3  Energy: 85% (120%MT)  Trains: 75          2/20/2025     1:27 PM 2/21/2025      1:32 PM 2/24/2025    12:42 PM   PHQ-9 SCORE   PHQ-9 Total Score MyChart   15 (Moderately severe depression)   PHQ-9 Total Score 27 21 15        Patient-reported       Date MADRS QIDS PHQ-9   02/06/25 34 22 24   2/14/25  24 26   2/21/25  21 21     Plan   - Continue TMS treatments    This service provided today was under the supervising provider of the day, Devon Pandey MD, who was available if needed.    Judie Garcia, TMS Technician  [facility] Neuromodulation

## 2025-02-25 ENCOUNTER — VIRTUAL VISIT (OUTPATIENT)
Dept: PSYCHIATRY | Facility: CLINIC | Age: 25
End: 2025-02-25
Payer: COMMERCIAL

## 2025-02-25 ENCOUNTER — OFFICE VISIT (OUTPATIENT)
Dept: PSYCHIATRY | Facility: CLINIC | Age: 25
End: 2025-02-25
Payer: COMMERCIAL

## 2025-02-25 DIAGNOSIS — F33.2 SEVERE EPISODE OF RECURRENT MAJOR DEPRESSIVE DISORDER, WITHOUT PSYCHOTIC FEATURES (H): Primary | ICD-10-CM

## 2025-02-25 NOTE — PROGRESS NOTES
Clinician Contact & Progress Note  Department of Psychiatry & Behavioral Sciences  [facility]    Patient: Melinda Welch (2000)     MRN: [id number]  Date of Treatment:  Feb 25, 2025  Duration of Treatment: Start Time: 3:30 PM End Time: 4:30 PM  Provider: Rocio Iqbal M.A.  Supervisor: Abdirahman Senior, Ph.D., L.P.    People present:   Provider: Rocio Iqbal M.A.  Patient: Melinda Welch    Mode of communication: Virtual Visit Details    Type of service:  Video Visit   Video Start Time:  3:30 PM  Video End Time: 4:30 PM    Originating Location (pt. Location): Home  Distant Location (provider location):  On-site  Platform used for Video Visit: [employer]       Diagnoses:   Encounter Diagnosis   Name Primary?    Severe episode of recurrent major depressive disorder, without psychotic features (H) Yes        Assessment (current symptoms):      2/21/2025     1:32 PM 2/24/2025    12:42 PM 2/25/2025     1:23 PM   PHQ   PHQ-9 Total Score 21 15  21   Q9: Thoughts of better off dead/self-harm past 2 weeks More than half the days Several days More than half the days   F/U: Thoughts of suicide or self-harm  Yes    F/U: Self harm-plan  No    F/U: Self-harm action  No    F/U: Safety concerns  No        Patient-reported         1/18/2025     1:34 PM 2/4/2025     9:50 AM 2/18/2025    12:38 PM   RU-7 SCORE   Total Score 2 (minimal anxiety) 4 (minimal anxiety) 3 (minimal anxiety)   Total Score 2  4  3        Patient-reported     Session content: Pt presented for follow-up in Treatment Resistant Depression clinic for psychotherapy visit with writer. Today focused on reviewing Self-monitoring and discussing first steps towards pt's career goals. Pt completed Self-monitoring and reported high frequency of enjoyable activities, with fewer highly-important activities noted. Pt and therapist discussed this behavioral pattern, and pt reported feeling that enjoyable activities are a way they avoid uncomfortable  "thoughts about how \"I'm not on the same wavelength\" as other people around pt's age. Therapist assessed suicidal ideation and urges towards self-harm, both of which pt denied experiencing over past week. Pt and therapist then discussed pros and cons of 4 careers discussed last week and brainstormed concrete first steps for looking into those careers. Pt landed on reading disability accommodation webpages for 5 colleges as concrete first step towards education/career goals. Therapist and pt agreed upon this step as between-session homework, discussing potential barriers to completion and strategies for addressing these barriers (e.g., taking Tylenol if pt gets headache).     Treatment strategies:   Reviewed Self-Monitoring homework  Discussed pattern of using enjoyable activities as form of avoidance   Assessed suicidal ideation and urges towards self-harm  Practiced empathetic listening and validation techniques  Reviewed pros and cons of 4 careers discussed the previous week  Brainstormed concrete first steps for learning about these career paths  Agreed upon researching 5 colleges as between-session activation homework for next week    Homework:  Read about the disability accommodations of 5 different colleges at 1:30 PM    Response to treatment:  Pt progress towards goals has been N/A, recent onset of treatment    Treatment Plan/ Disposition:   Continue weekly BA with writer  Next appointment: March 4, 2025  [place] with other (eg., psychiatric) treatment providers    Patient did not report any changes to medications    Mental Status Exam:  Alertness: alert  and oriented  Appearance: adequately groomed  Behavior/Demeanor: cooperative, with good  eye contact   Speech: normal and regular rate and rhythm  Language: intact and no problems. Preferred language identified as English.  Psychomotor: normal or unremarkable  Mood: description consistent with euthymia  Affect: full range; was congruent to mood; was congruent to " "content  Thought Process/Associations: unremarkable  Thought Content:  Reports none  -Suicidal ideation: denies ideation, denies plan, denies intent  -Homicidal Ideation: does not report  Perception:  Reports none;  Denies none; intact  Insight: adequate  Judgment: fair  Cognition: does  appear grossly intact    Billing for \"Interactive Complexity\"?    No    Rocio Iqbal M.A.    Answers submitted by the patient for this visit:  Patient Health Questionnaire (Submitted on 2/24/2025)  If you checked off any problems, how difficult have these problems made it for you to do your work, take care of things at home, or get along with other people?: Very difficult  PHQ9 TOTAL SCORE: 15  Patient Health Questionnaire (G7) (Submitted on 2/18/2025)  RU 7 TOTAL SCORE: 3    "

## 2025-02-25 NOTE — PROGRESS NOTES
"Clinician Contact & Progress Note  Department of Psychiatry & Behavioral Sciences  [facility]    Patient: Melinda Welch (2000)     MRN: [id number]  Date of Treatment:  Feb 18, 2025  Duration of Treatment: Start Time: 3:30 PM End Time: 4:30 PM  Provider: Rocio Iqbal M.A.  Supervisor: Abdirahman Senior, Ph.D., L.P.    People present:   Provider: Rocio Iqbal M.A.  Patient: Melinda Welch    Mode of communication: Virtual Visit Details    Type of service:  Video Visit   Video Start Time:  3:30 PM  Video End Time: 4:30 PM    Originating Location (pt. Location): Home  Distant Location (provider location):  On-site  Platform used for Video Visit: [employer]       Diagnoses:   Encounter Diagnosis   Name Primary?    Severe episode of recurrent major depressive disorder, without psychotic features (H) Yes        Assessment (current symptoms):      2/20/2025     1:27 PM 2/21/2025     1:32 PM 2/24/2025    12:42 PM   PHQ   PHQ-9 Total Score 27 21 15    Q9: Thoughts of better off dead/self-harm past 2 weeks Nearly every day More than half the days Several days   F/U: Thoughts of suicide or self-harm   Yes   F/U: Self harm-plan   No   F/U: Self-harm action   No   F/U: Safety concerns   No       Patient-reported         1/18/2025     1:34 PM 2/4/2025     9:50 AM 2/18/2025    12:38 PM   RU-7 SCORE   Total Score 2 (minimal anxiety) 4 (minimal anxiety) 3 (minimal anxiety)   Total Score 2  4  3        Patient-reported     Session content: Pt presented for follow-up in Treatment Resistant Depression clinic for psychotherapy visit with writer. Today focused on checking in on Self-monitoring and discussing pt's career goals. Pt did not do self-monitoring homework due to a \"draining\" week with \"really, really bad depression.\" Pt reported passive suicidal ideation occurring on several days but no intent, urges, or plan. Pt and therapist agreed upon re-assigning 3 days of self-monitoring as between-session " "homework and in the meantime discussed career values/goals pt is currently considering. 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 Pt and therapist agreed upon short journaling about pros/cons of different jobs as short homework to add to self-monitoring over the next week.    Treatment strategies:   Assessed completion of Self-Monitoring homework  Brainstormed strategies to make self-monitoring more feasible  Assessed suicidal ideation and urges towards self-harm  Practiced empathetic listening and validation techniques  Discussed career goals and values related to several different types of jobs pt may pursue  Assigned 3 days of self-monitoring and journaling about job pros/cons as between-session homework    Homework:  3 days of behavioral self-monitoring, list out pros and cons of 4 different potential jobs    Response to treatment:  Pt progress towards goals has been N/A, recent onset of treatment    Treatment Plan/ Disposition:   Continue weekly BA with writer  Next appointment: Feb 25, 2025  [place] with other (eg., psychiatric) treatment providers    Patient did not report any changes to medications    Mental Status Exam:  Alertness: alert  and oriented  Appearance: adequately groomed  Behavior/Demeanor: cooperative, with good  eye contact   Speech: normal and regular rate and rhythm  Language: intact and no problems. Preferred language identified as English.  Psychomotor: normal or unremarkable  Mood: description consistent with euthymia  Affect: full range; was congruent to mood; was congruent to content  Thought Process/Associations: unremarkable  Thought Content:  Reports passive suicidal ideation without plan or intent  -Suicidal ideation: reports ideation, denies plan, denies intent  -Homicidal Ideation: does not " "report  Perception:  Reports none;  Denies none; intact  Insight: adequate  Judgment: fair  Cognition: does  appear grossly intact    Billing for \"Interactive Complexity\"?    No    Rocio Iqbal M.A.    Answers submitted by the patient for this visit:  Patient Health Questionnaire (Submitted on 2/17/2025)  If you checked off any problems, how difficult have these problems made it for you to do your work, take care of things at home, or get along with other people?: Extremely difficult  PHQ9 TOTAL SCORE: 27  Patient Health Questionnaire (G7) (Submitted on 2/18/2025)  RU 7 TOTAL SCORE: 3    "

## 2025-02-25 NOTE — PROGRESS NOTES
Interventional Psychiatry Program  [address], [address]  [place] [address]  TMS Procedure Note   Melinda Welch MRN# [id number]  Age: 24 year old  YOB: 2000    Melinda Welch comes into clinic today at the request of, Kateryna Boland MD, ordering provider for TMS treatments.    Pre-Procedure:  History and Physical: Reviewed in medical record  Consent signed by: Melinda Welch for this course of treatment on: 02/06/2025    Clinical Narrative:  Patient tolerated treatment. Has gotten out for a few walks with the nice weather.    Indications for TMS:  MDD, recurrent, severe; 4+ medication trials (from 2+ classes) ineffective; Psychotherapy ineffective    Procedure Diagnosis:  MDD, severe, recurrent F33.2    Treatment Hx:  Treatment number this series: 15  Total lifetime treatment number: 15    Allergies   Allergen Reactions    Seasonal Allergies       LMP 08/01/2024 (Within Days)     Pause for the Cause  Right patient: Yes  Right procedure/correct coil:  Yes; rTMS; cpt 53387; F8 coil.   Earplugs in place:  Yes    Procedure  Patient was seated in procedure chair. Identity and procedure was verified. Ear plugs were placed in ears and patient-specific cap was placed on head and tightened appropriately. Ruler locations were verified. Bone conducting headphones were not used. Coil was placed at F3 and stimulator was set to 85% (120% of MT). Initial train was well tolerated so 75 trains were delivered. Patient tolerated procedure well with minimal right eyebrow movement.    Motor Threshold Determination  Distance from nasion to inion: 36cm  Tragus to tragus distance: 39cm  Head circumference: 59.5cm  Distance along the vertex: 9.83cm  Distance along circumference from midline: 6.84cm  Cap to Nasion: 8cm  MT 1: F3 @ 71% on 02/06/2025    Standard LDLPFC  Frequency: 10  Train Duration: 4  Total pulses delivered: 3000  Inter-train interval: 15  Tx Loc: F3  Energy: 85% (120%MT)  Trains: 75           2/20/2025     1:27 PM 2/21/2025     1:32 PM 2/24/2025    12:42 PM   PHQ-9 SCORE   PHQ-9 Total Score MyChart   15 (Moderately severe depression)   PHQ-9 Total Score 27 21 15        Patient-reported       Date MADRS QIDS PHQ-9   02/06/25 34 22 24   2/14/25  24 26   2/21/25  21 21     Plan   - Continue TMS treatments    This service provided today was under the supervising provider of the day, NAT Sam MD, who was available if needed.    Monserrat Padgett TMS Technician  [facility]  Mental [facility] Neuromodulation

## 2025-02-26 ENCOUNTER — OFFICE VISIT (OUTPATIENT)
Dept: PSYCHIATRY | Facility: CLINIC | Age: 25
End: 2025-02-26
Payer: COMMERCIAL

## 2025-02-26 DIAGNOSIS — F33.2 SEVERE EPISODE OF RECURRENT MAJOR DEPRESSIVE DISORDER, WITHOUT PSYCHOTIC FEATURES (H): Primary | ICD-10-CM

## 2025-02-26 ASSESSMENT — PATIENT HEALTH QUESTIONNAIRE - PHQ9: SUM OF ALL RESPONSES TO PHQ QUESTIONS 1-9: 21

## 2025-02-26 NOTE — PROGRESS NOTES
Interventional Psychiatry Program  [address], [address]  [place] [address]  TMS Procedure Note   Melinda Welch MRN# [id number]  Age: 24 year old  YOB: 2000    Melinda Welch comes into clinic today at the request of, Kateryna Boland MD, ordering provider for TMS treatments.    Pre-Procedure:  History and Physical: Reviewed in medical record  Consent signed by: Melinda Welch for this course of treatment on: 02/06/2025    Clinical Narrative:  Patient tolerated treatment. Decided not to go to [facility].     Indications for TMS:  MDD, recurrent, severe; 4+ medication trials (from 2+ classes) ineffective; Psychotherapy ineffective    Procedure Diagnosis:  MDD, severe, recurrent F33.2    Treatment Hx:  Treatment number this series: 16  Total lifetime treatment number: 16    Allergies   Allergen Reactions    Seasonal Allergies       LMP 08/01/2024 (Within Days)     Pause for the Cause  Right patient: Yes  Right procedure/correct coil:  Yes; rTMS; cpt 14436; F8 coil.   Earplugs in place:  Yes    Procedure  Patient was seated in procedure chair. Identity and procedure was verified. Ear plugs were placed in ears and patient-specific cap was placed on head and tightened appropriately. Ruler locations were verified. Bone conducting headphones were not used. Coil was placed at F3 and stimulator was set to 85% (120% of MT). Initial train was well tolerated so 75 trains were delivered. Patient tolerated procedure well with minimal right eyebrow movement.    Motor Threshold Determination  Distance from nasion to inion: 36cm  Tragus to tragus distance: 39cm  Head circumference: 59.5cm  Distance along the vertex: 9.83cm  Distance along circumference from midline: 6.84cm  Cap to Nasion: 8cm  MT 1: F3 @ 71% on 02/06/2025    Standard LDLPFC  Frequency: 10  Train Duration: 4  Total pulses delivered: 3000  Inter-train interval: 15  Tx Loc: F3  Energy: 85% (120%MT)  Trains: 75          2/24/2025     12:42 PM 2/25/2025     1:23 PM 2/26/2025     1:11 PM   PHQ-9 SCORE   PHQ-9 Total Score MyChart 15 (Moderately severe depression)     PHQ-9 Total Score 15  21 21       Patient-reported       Date MADRS QIDS PHQ-9   02/06/25 34 22 24   2/14/25  24 26   2/21/25  21 21     Plan   - Continue TMS treatments    This service provided today was under the supervising provider of the day, Michael Child MD, who was available if needed.    Flora Jerez, TMS Technician  [facility]  Mental [facility] Neuromodulation

## 2025-02-27 ENCOUNTER — ALLIED HEALTH/NURSE VISIT (OUTPATIENT)
Dept: PSYCHIATRY | Facility: CLINIC | Age: 25
End: 2025-02-27
Payer: COMMERCIAL

## 2025-02-27 ENCOUNTER — OFFICE VISIT (OUTPATIENT)
Dept: PSYCHIATRY | Facility: CLINIC | Age: 25
End: 2025-02-27
Payer: COMMERCIAL

## 2025-02-27 DIAGNOSIS — F33.2 SEVERE EPISODE OF RECURRENT MAJOR DEPRESSIVE DISORDER, WITHOUT PSYCHOTIC FEATURES (H): Primary | ICD-10-CM

## 2025-02-27 ASSESSMENT — PATIENT HEALTH QUESTIONNAIRE - PHQ9
SUM OF ALL RESPONSES TO PHQ QUESTIONS 1-9: 15
SUM OF ALL RESPONSES TO PHQ QUESTIONS 1-9: 19

## 2025-02-27 NOTE — PROGRESS NOTES
Physicians:  Care Coordination Note     SITUATION   Melinda Welch is a 24 year old female who has been receiving Transcranial Magnetic Stimulation (TMS) at the request of Kateryna Boland MD.    BACKGROUND     During course of TMS    ASSESSMENT        Met with patient today to check on her during TMS course.    During today's discussion writer and patient discussed:    Patient reports that she has been tolerating TMS well.  She currently denies side effects of TMS.  She also notes that she has been noticing some small changes with TMS.  The main thing she has noted is some increase in energy.  We did review a typical TMS response and the changes that she is noticing are a good sign.  Overall patient  states that TMS is going well and had no further questions or concerns at this time.  She was instructed to reach out to this writer if she had any questions between now and next check in.      PHQ 9: 19  # of completed TMS Sessions: 17          PLAN       Nursing Interventions: TMS edu    Follow-up plan:  RN to continue to follow    Sveta Gonzalez RN

## 2025-02-27 NOTE — PROGRESS NOTES
Interventional Psychiatry Program  [address], [address]  [place] [address]  TMS Procedure Note   Melinda Welch MRN# [id number]  Age: 24 year old  YOB: 2000    Melinda Welch comes into clinic today at the request of, Kateryna Boland MD, ordering provider for TMS treatments.    Pre-Procedure:  History and Physical: Reviewed in medical record  Consent signed by: Melinda Welch for this course of treatment on: 02/06/2025    Clinical Narrative:  Patient tolerated treatment. Nurse check in.     Indications for TMS:  MDD, recurrent, severe; 4+ medication trials (from 2+ classes) ineffective; Psychotherapy ineffective    Procedure Diagnosis:  MDD, severe, recurrent F33.2    Treatment Hx:  Treatment number this series: 17  Total lifetime treatment number: 17    Allergies   Allergen Reactions    Seasonal Allergies       LMP 08/01/2024 (Within Days)     Pause for the Cause  Right patient: Yes  Right procedure/correct coil:  Yes; rTMS; cpt 55109; F8 coil.   Earplugs in place:  Yes    Procedure  Patient was seated in procedure chair. Identity and procedure was verified. Ear plugs were placed in ears and patient-specific cap was placed on head and tightened appropriately. Ruler locations were verified. Bone conducting headphones were not used. Coil was placed at F3 and stimulator was set to 85% (120% of MT). Initial train was well tolerated so 75 trains were delivered. Patient tolerated procedure well with minimal right eyebrow movement.    Motor Threshold Determination  Distance from nasion to inion: 36cm  Tragus to tragus distance: 39cm  Head circumference: 59.5cm  Distance along the vertex: 9.83cm  Distance along circumference from midline: 6.84cm  Cap to Nasion: 8cm  MT 1: F3 @ 71% on 02/06/2025    Standard LDLPFC  Frequency: 10  Train Duration: 4  Total pulses delivered: 3000  Inter-train interval: 15  Tx Loc: F3  Energy: 85% (120%MT)  Trains: 75          2/24/2025    12:42 PM 2/25/2025      1:23 PM 2/26/2025     1:11 PM   PHQ-9 SCORE   PHQ-9 Total Score MyChart 15 (Moderately severe depression)     PHQ-9 Total Score 15  21 21       Patient-reported       Date MADRS QIDS PHQ-9   02/06/25 34 22 24   2/14/25  24 26   2/21/25  21 21     Plan   - Continue TMS treatments    This service provided today was under the supervising provider of the day, NAT Sam MD, who was available if needed.    Judie Garcia, TMS Technician  [facility] Neuromodulation

## 2025-03-03 ASSESSMENT — PATIENT HEALTH QUESTIONNAIRE - PHQ9
SUM OF ALL RESPONSES TO PHQ QUESTIONS 1-9: 20
SUM OF ALL RESPONSES TO PHQ QUESTIONS 1-9: 20
10. IF YOU CHECKED OFF ANY PROBLEMS, HOW DIFFICULT HAVE THESE PROBLEMS MADE IT FOR YOU TO DO YOUR WORK, TAKE CARE OF THINGS AT HOME, OR GET ALONG WITH OTHER PEOPLE: EXTREMELY DIFFICULT

## 2025-03-04 ENCOUNTER — VIRTUAL VISIT (OUTPATIENT)
Dept: PSYCHIATRY | Facility: CLINIC | Age: 25
End: 2025-03-04
Payer: COMMERCIAL

## 2025-03-04 DIAGNOSIS — F33.2 SEVERE EPISODE OF RECURRENT MAJOR DEPRESSIVE DISORDER, WITHOUT PSYCHOTIC FEATURES (H): Primary | ICD-10-CM

## 2025-03-04 ASSESSMENT — ANXIETY QUESTIONNAIRES
GAD7 TOTAL SCORE: 5
GAD7 TOTAL SCORE: 5
3. WORRYING TOO MUCH ABOUT DIFFERENT THINGS: NOT AT ALL
GAD7 TOTAL SCORE: 5
4. TROUBLE RELAXING: SEVERAL DAYS
IF YOU CHECKED OFF ANY PROBLEMS ON THIS QUESTIONNAIRE, HOW DIFFICULT HAVE THESE PROBLEMS MADE IT FOR YOU TO DO YOUR WORK, TAKE CARE OF THINGS AT HOME, OR GET ALONG WITH OTHER PEOPLE: SOMEWHAT DIFFICULT
1. FEELING NERVOUS, ANXIOUS, OR ON EDGE: SEVERAL DAYS
8. IF YOU CHECKED OFF ANY PROBLEMS, HOW DIFFICULT HAVE THESE MADE IT FOR YOU TO DO YOUR WORK, TAKE CARE OF THINGS AT HOME, OR GET ALONG WITH OTHER PEOPLE?: SOMEWHAT DIFFICULT
7. FEELING AFRAID AS IF SOMETHING AWFUL MIGHT HAPPEN: SEVERAL DAYS
6. BECOMING EASILY ANNOYED OR IRRITABLE: SEVERAL DAYS
7. FEELING AFRAID AS IF SOMETHING AWFUL MIGHT HAPPEN: SEVERAL DAYS
5. BEING SO RESTLESS THAT IT IS HARD TO SIT STILL: SEVERAL DAYS
2. NOT BEING ABLE TO STOP OR CONTROL WORRYING: NOT AT ALL

## 2025-03-04 NOTE — PROGRESS NOTES
Clinician Contact & Progress Note  Department of Psychiatry & Behavioral Sciences  [facility]    Patient: Melinda Welch (2000)     MRN: [id number]  Date of Treatment:  Mar 4, 2025  Duration of Treatment: Start Time: 3:30 PM End Time: 4:30 PM  Provider: Rocio Iqbal M.A.  Supervisor: Abdirahman Senior, Ph.D., L.P.    People present:   Provider: Rocio Iqbal M.A.  Patient: Melinda Welch    Mode of communication: Virtual Visit Details    Type of service:  Video Visit   Video Start Time:  3:30 PM  Video End Time: 4:30 PM    Originating Location (pt. Location): Home  Distant Location (provider location):  On-site  Platform used for Video Visit: [employer]     Diagnoses:   Encounter Diagnosis   Name Primary?    Severe episode of recurrent major depressive disorder, without psychotic features (H) Yes      Assessment (current symptoms):      2/28/2025    12:56 PM 2/28/2025     1:38 PM 3/3/2025     2:52 PM   PHQ   PHQ-9 Total Score 20  19 20    Q9: Thoughts of better off dead/self-harm past 2 weeks More than half the days More than half the days More than half the days   F/U: Thoughts of suicide or self-harm No  No   F/U: Safety concerns No  No       Patient-reported         2/4/2025     9:50 AM 2/18/2025    12:38 PM 3/4/2025     2:40 PM   RU-7 SCORE   Total Score 4 (minimal anxiety) 3 (minimal anxiety) 5 (mild anxiety)   Total Score 4  3  5        Patient-reported     Session content: Pt presented for follow-up in Treatment Resistant Depression clinic for psychotherapy visit with writer. Today focused on reviewing previous week's homework and continuing discussion of first steps towards pt's career goals. Pt denied experiencing suicidal ideation or urges towards self-harm. Pt completed previous homework of reading about the disability accommodations of 5 different colleges and discussed several public-transit and disability-friendly colleges found online that may be of interest to pt. Pt and  "therapist continued discussion of first steps towards meaningful career goals and obstacles in the way of these goals. Pt noted factors like procrastination, anxiety, fear of failure, and academic difficulty with math to feel like prominent obstacles to pursuing further education. 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     Treatment strategies:   Reviewed previous week's homework  Assessed suicidal ideation and urges towards self-harm  Practiced empathetic listening and validation techniques  Brainstormed current obstacles towards pursuing career goals  Agreed upon reaching out to  about job-shadowing as between-session homework    Homework:  Reach out to  and request to job-shadow    Response to treatment:  Pt progress towards goals has been N/A, recent onset of treatment    Treatment Plan/ Disposition:   Continue weekly BA with writer  Next appointment: March 11, 2025  [place] with other (eg., psychiatric) treatment providers    Patient did not report any changes to medications    Mental Status Exam:  Alertness: alert  and oriented  Appearance: adequately groomed  Behavior/Demeanor: cooperative, with good  eye contact   Speech: normal and regular rate and rhythm  Language: intact and no problems. Preferred language identified as English.  Psychomotor: normal or unremarkable  Mood: description consistent with euthymia  Affect: full range; was congruent to mood; was congruent to content  Thought Process/Associations: unremarkable  Thought Content:  Reports none  -Suicidal ideation: denies SI  -Homicidal Ideation: does not report  Perception:  Reports none;  Denies none; intact  Insight: adequate  Judgment: fair  Cognition: does  appear grossly intact    Billing for \"Interactive " "Complexity\"?    No    Rocio Iqbal M.A.    "

## 2025-03-05 ENCOUNTER — OFFICE VISIT (OUTPATIENT)
Dept: PSYCHIATRY | Facility: CLINIC | Age: 25
End: 2025-03-05
Payer: COMMERCIAL

## 2025-03-05 DIAGNOSIS — F33.2 SEVERE EPISODE OF RECURRENT MAJOR DEPRESSIVE DISORDER, WITHOUT PSYCHOTIC FEATURES (H): Primary | ICD-10-CM

## 2025-03-05 ASSESSMENT — PATIENT HEALTH QUESTIONNAIRE - PHQ9
10. IF YOU CHECKED OFF ANY PROBLEMS, HOW DIFFICULT HAVE THESE PROBLEMS MADE IT FOR YOU TO DO YOUR WORK, TAKE CARE OF THINGS AT HOME, OR GET ALONG WITH OTHER PEOPLE: EXTREMELY DIFFICULT
SUM OF ALL RESPONSES TO PHQ QUESTIONS 1-9: 20
SUM OF ALL RESPONSES TO PHQ QUESTIONS 1-9: 20
10. IF YOU CHECKED OFF ANY PROBLEMS, HOW DIFFICULT HAVE THESE PROBLEMS MADE IT FOR YOU TO DO YOUR WORK, TAKE CARE OF THINGS AT HOME, OR GET ALONG WITH OTHER PEOPLE: EXTREMELY DIFFICULT
SUM OF ALL RESPONSES TO PHQ QUESTIONS 1-9: 20
SUM OF ALL RESPONSES TO PHQ QUESTIONS 1-9: 20

## 2025-03-05 NOTE — PROGRESS NOTES
Interventional Psychiatry Program  [address] [facility] [address], [address]  [place] [address]  TMS Procedure Note   Melinda Welch MRN# [id number]  Age: 24 year old  YOB: 2000    Melinda Welch comes into clinic today at the request of, Kateryna Boland MD, ordering provider for TMS treatments.    Pre-Procedure:  History and Physical: Reviewed in medical record  Consent signed by: Melinda Welch for this course of treatment on: 02/06/2025    Clinical Narrative:  Patient tolerated treatment. No changes reported.     Indications for TMS:  MDD, recurrent, severe; 4+ medication trials (from 2+ classes) ineffective; Psychotherapy ineffective    Procedure Diagnosis:  MDD, severe, recurrent F33.2    Treatment Hx:  Treatment number this series: 19  Total lifetime treatment number: 19    Allergies   Allergen Reactions    Seasonal Allergies       LMP 08/01/2024 (Within Days)     Pause for the Cause  Right patient: Yes  Right procedure/correct coil:  Yes; rTMS; cpt 52779; F8 coil.   Earplugs in place:  Yes    Procedure  Patient was seated in procedure chair. Identity and procedure was verified. Ear plugs were placed in ears and patient-specific cap was placed on head and tightened appropriately. Ruler locations were verified. Bone conducting headphones were not used. Coil was placed at F3 and stimulator was set to 85% (120% of MT). Initial train was well tolerated so 75 trains were delivered. Patient tolerated procedure well with minimal right eyebrow movement.    Motor Threshold Determination  Distance from nasion to inion: 36cm  Tragus to tragus distance: 39cm  Head circumference: 59.5cm  Distance along the vertex: 9.83cm  Distance along circumference from midline: 6.84cm  Cap to Nasion: 8cm  MT 1: F3 @ 71% on 02/06/2025    Standard LDLPFC  Frequency: 10  Train Duration: 4  Total pulses delivered: 3000  Inter-train interval: 15  Tx Loc: F3  Energy: 85% (120%MT)  Trains: 75          2/28/2025     1:38 PM 3/3/2025      2:52 PM 3/5/2025    12:43 PM   PHQ-9 SCORE   PHQ-9 Total Score MyChart  20 (Severe depression) 20 (Severe depression)   PHQ-9 Total Score 19 20  20        Patient-reported       Date MADRS QIDS PHQ-9   02/06/25 34 22 24   2/14/25  24 26   2/21/25  21 21   2/28/25  17 19     Plan   - Continue TMS treatments    This service provided today was under the supervising provider of the day, Michael Child MD, who was available if needed.    Judie Garcia, TMS Technician  [facility] Neuromodulation

## 2025-03-06 ENCOUNTER — OFFICE VISIT (OUTPATIENT)
Dept: PSYCHIATRY | Facility: CLINIC | Age: 25
End: 2025-03-06
Payer: COMMERCIAL

## 2025-03-06 DIAGNOSIS — F33.2 SEVERE EPISODE OF RECURRENT MAJOR DEPRESSIVE DISORDER, WITHOUT PSYCHOTIC FEATURES (H): Primary | ICD-10-CM

## 2025-03-06 ASSESSMENT — PATIENT HEALTH QUESTIONNAIRE - PHQ9: SUM OF ALL RESPONSES TO PHQ QUESTIONS 1-9: 20

## 2025-03-06 NOTE — PROGRESS NOTES
Interventional Psychiatry Program  [address] [facility], [address]  [place] [address]  TMS Procedure Note   Melinda Welch MRN# [id number]  Age: 24 year old  YOB: 2000    Melinda Welch comes into clinic today at the request of, Kateryna Boland MD, ordering provider for TMS treatments.    Pre-Procedure:  History and Physical: Reviewed in medical record  Consent signed by: Melinda Welch for this course of treatment on: 02/06/2025    Clinical Narrative:  Patient tolerated treatment. No plans for today.    Indications for TMS:  MDD, recurrent, severe; 4+ medication trials (from 2+ classes) ineffective; Psychotherapy ineffective    Procedure Diagnosis:  MDD, severe, recurrent F33.2    Treatment Hx:  Treatment number this series: 20  Total lifetime treatment number: 20    Allergies   Allergen Reactions    Seasonal Allergies       LMP 08/01/2024 (Within Days)     Pause for the Cause  Right patient: Yes  Right procedure/correct coil:  Yes; rTMS; cpt 57076; F8 coil.   Earplugs in place:  Yes    Procedure  Patient was seated in procedure chair. Identity and procedure was verified. Ear plugs were placed in ears and patient-specific cap was placed on head and tightened appropriately. Ruler locations were verified. Bone conducting headphones were not used. Coil was placed at F3 and stimulator was set to 85% (120% of MT). Initial train was well tolerated so 75 trains were delivered. Patient tolerated procedure well with minimal right eyebrow movement.    Motor Threshold Determination  Distance from nasion to inion: 36cm  Tragus to tragus distance: 39cm  Head circumference: 59.5cm  Distance along the vertex: 9.83cm  Distance along circumference from midline: 6.84cm  Cap to Nasion: 8cm  MT 1: F3 @ 71% on 02/06/2025    Standard LDLPFC  Frequency: 10  Train Duration: 4  Total pulses delivered: 3000  Inter-train interval: 15  Tx Loc: F3  Energy: 85% (120%MT)  Trains: 75          2/28/2025     1:38 PM 3/3/2025      2:52 PM 3/5/2025    12:43 PM   PHQ-9 SCORE   PHQ-9 Total Score MyChart  20 (Severe depression) 20 (Severe depression)   PHQ-9 Total Score 19 20  20        Patient-reported       Date MADRS QIDS PHQ-9   02/06/25 34 22 24   2/14/25  24 26   2/21/25  21 21   2/28/25  17 19     Plan   - Continue TMS treatments    This service provided today was under the supervising provider of the day, Kateryna Boland MD, who was available if needed.    Monserrat Padgett TMS Technician  [facility]  Mental [facility] Neuromodulation

## 2025-03-10 ENCOUNTER — OFFICE VISIT (OUTPATIENT)
Dept: PSYCHIATRY | Facility: CLINIC | Age: 25
End: 2025-03-10
Payer: COMMERCIAL

## 2025-03-10 DIAGNOSIS — F33.2 SEVERE EPISODE OF RECURRENT MAJOR DEPRESSIVE DISORDER, WITHOUT PSYCHOTIC FEATURES (H): Primary | ICD-10-CM

## 2025-03-10 ASSESSMENT — PATIENT HEALTH QUESTIONNAIRE - PHQ9
SUM OF ALL RESPONSES TO PHQ QUESTIONS 1-9: 24
10. IF YOU CHECKED OFF ANY PROBLEMS, HOW DIFFICULT HAVE THESE PROBLEMS MADE IT FOR YOU TO DO YOUR WORK, TAKE CARE OF THINGS AT HOME, OR GET ALONG WITH OTHER PEOPLE: EXTREMELY DIFFICULT
10. IF YOU CHECKED OFF ANY PROBLEMS, HOW DIFFICULT HAVE THESE PROBLEMS MADE IT FOR YOU TO DO YOUR WORK, TAKE CARE OF THINGS AT HOME, OR GET ALONG WITH OTHER PEOPLE: EXTREMELY DIFFICULT
SUM OF ALL RESPONSES TO PHQ QUESTIONS 1-9: 24

## 2025-03-10 NOTE — PROGRESS NOTES
Interventional Psychiatry Program  [address] [facility], [address]  [place] [address]  TMS Procedure Note   Melinda Welch MRN# [id number]  Age: 24 year old  YOB: 2000    Melinda Welch comes into clinic today at the request of, Kateryna Boland MD, ordering provider for TMS treatments.    Pre-Procedure:  History and Physical: Reviewed in medical record  Consent signed by: Melinda Welch for this course of treatment on: 02/06/2025    Clinical Narrative:  Patient tolerated treatment. Reported an increase in OCD symptoms.     Indications for TMS:  MDD, recurrent, severe; 4+ medication trials (from 2+ classes) ineffective; Psychotherapy ineffective    Procedure Diagnosis:  MDD, severe, recurrent F33.2    Treatment Hx:  Treatment number this series: 21  Total lifetime treatment number: 21    Allergies   Allergen Reactions    Seasonal Allergies       LMP 08/01/2024 (Within Days)     Pause for the Cause  Right patient: Yes  Right procedure/correct coil:  Yes; rTMS; cpt 94063; F8 coil.   Earplugs in place:  Yes    Procedure  Patient was seated in procedure chair. Identity and procedure was verified. Ear plugs were placed in ears and patient-specific cap was placed on head and tightened appropriately. Ruler locations were verified. Bone conducting headphones were not used. Coil was placed at F3 and stimulator was set to 85% (120% of MT). Initial train was well tolerated so 75 trains were delivered. Patient tolerated procedure well with minimal right eyebrow movement.    Motor Threshold Determination  Distance from nasion to inion: 36cm  Tragus to tragus distance: 39cm  Head circumference: 59.5cm  Distance along the vertex: 9.83cm  Distance along circumference from midline: 6.84cm  Cap to Nasion: 8cm  MT 1: F3 @ 71% on 02/06/2025    Standard LDLPFC  Frequency: 10  Train Duration: 4  Total pulses delivered: 3000  Inter-train interval: 15  Tx Loc: F3  Energy: 85% (120%MT)  Trains: 75          3/6/2025      1:32 PM 3/7/2025     1:40 PM 3/10/2025    12:15 PM   PHQ-9 SCORE   PHQ-9 Total Score MyChart   24 (Severe depression)   PHQ-9 Total Score 21 20 24        Patient-reported       Date MADRS QIDS PHQ-9   02/06/25 34 22 24   2/14/25  24 26   2/21/25  21 21   2/28/25  17 19   3/7/25  20 20     Plan   - Continue TMS treatments    This service provided today was under the supervising provider of the day, Devon Pandey MD, who was available if needed.    Flora Jerez TMS Technician  [facility]  Mental Health Neuromodulation

## 2025-03-11 ENCOUNTER — OFFICE VISIT (OUTPATIENT)
Dept: PSYCHIATRY | Facility: CLINIC | Age: 25
End: 2025-03-11
Payer: COMMERCIAL

## 2025-03-11 DIAGNOSIS — F33.2 SEVERE EPISODE OF RECURRENT MAJOR DEPRESSIVE DISORDER, WITHOUT PSYCHOTIC FEATURES (H): Primary | ICD-10-CM

## 2025-03-11 NOTE — PROGRESS NOTES
Interventional Psychiatry Program  [address] [facility], [address]  [place] [address]  TMS Procedure Note   Melinda Welch MRN# [id number]  Age: 24 year old  YOB: 2000    Melinda Welch comes into clinic today at the request of, Kateryna Boland MD, ordering provider for TMS treatments.    Pre-Procedure:  History and Physical: Reviewed in medical record  Consent signed by: Melinda Welch for this course of treatment on: 02/06/2025    Clinical Narrative:  Patient tolerated treatment. Going to add bilateral starting tomorrow.     Indications for TMS:  MDD, recurrent, severe; 4+ medication trials (from 2+ classes) ineffective; Psychotherapy ineffective    Procedure Diagnosis:  MDD, severe, recurrent F33.2    Treatment Hx:  Treatment number this series: 22  Total lifetime treatment number: 22    Allergies   Allergen Reactions    Seasonal Allergies       LMP 08/01/2024 (Within Days)     Pause for the Cause  Right patient: Yes  Right procedure/correct coil:  Yes; rTMS; cpt 67111; F8 coil.   Earplugs in place:  Yes    Procedure  Patient was seated in procedure chair. Identity and procedure was verified. Ear plugs were placed in ears and patient-specific cap was placed on head and tightened appropriately. Ruler locations were verified. Bone conducting headphones were not used. Coil was placed at F3 and stimulator was set to 85% (120% of MT). Initial train was well tolerated so 75 trains were delivered. Patient tolerated procedure well with minimal right eyebrow movement.    Motor Threshold Determination  Distance from nasion to inion: 36cm  Tragus to tragus distance: 39cm  Head circumference: 59.5cm  Distance along the vertex: 9.83cm  Distance along circumference from midline: 6.84cm  Cap to Nasion: 8cm  MT 1: F3 @ 71% on 02/06/2025    Standard LDLPFC  Frequency: 10  Train Duration: 4  Total pulses delivered: 3000  Inter-train interval: 15  Tx Loc: F3  Energy: 85% (120%MT)  Trains: 75          3/7/2025      1:40 PM 3/10/2025    12:15 PM 3/11/2025     1:41 PM   PHQ-9 SCORE   PHQ-9 Total Score MyChart  24 (Severe depression)    PHQ-9 Total Score 20 24  22       Patient-reported       Date MADRS QIDS PHQ-9   02/06/25 34 22 24   2/14/25  24 26   2/21/25  21 21   2/28/25  17 19   3/7/25  20 20     Plan   - Continue TMS treatments    This service provided today was under the supervising provider of the day, NAT Sam MD, who was available if needed.    Flora Jerez, TMS Technician  [facility]  Mental [facility] Neuromodulation

## 2025-03-12 ENCOUNTER — OFFICE VISIT (OUTPATIENT)
Dept: PSYCHIATRY | Facility: CLINIC | Age: 25
End: 2025-03-12
Payer: COMMERCIAL

## 2025-03-12 DIAGNOSIS — F33.2 SEVERE EPISODE OF RECURRENT MAJOR DEPRESSIVE DISORDER, WITHOUT PSYCHOTIC FEATURES (H): Primary | ICD-10-CM

## 2025-03-12 ASSESSMENT — PATIENT HEALTH QUESTIONNAIRE - PHQ9
SUM OF ALL RESPONSES TO PHQ QUESTIONS 1-9: 21
10. IF YOU CHECKED OFF ANY PROBLEMS, HOW DIFFICULT HAVE THESE PROBLEMS MADE IT FOR YOU TO DO YOUR WORK, TAKE CARE OF THINGS AT HOME, OR GET ALONG WITH OTHER PEOPLE: EXTREMELY DIFFICULT
10. IF YOU CHECKED OFF ANY PROBLEMS, HOW DIFFICULT HAVE THESE PROBLEMS MADE IT FOR YOU TO DO YOUR WORK, TAKE CARE OF THINGS AT HOME, OR GET ALONG WITH OTHER PEOPLE: EXTREMELY DIFFICULT

## 2025-03-12 NOTE — PROGRESS NOTES
Interventional Psychiatry Program  [address] [facility], [address]  [place] [address]  TMS Procedure Note   Melinda Welch MRN# [id number]  Age: 24 year old  YOB: 2000    Melinda Welch comes into clinic today at the request of, Kateryna Boland MD, ordering provider for TMS treatments.    Pre-Procedure:  History and Physical: Reviewed in medical record  Consent signed by: Melinda Welch for this course of treatment on: 02/06/2025    Clinical Narrative:  Patient tolerated treatment. First day of bilateral protocol.  Ran right side at 85%.    Indications for TMS:  MDD, recurrent, severe; 4+ medication trials (from 2+ classes) ineffective; Psychotherapy ineffective    Procedure Diagnosis:  MDD, severe, recurrent F33.2    Treatment Hx:  Treatment number this series: 23  Total lifetime treatment number: 23    Allergies   Allergen Reactions    Seasonal Allergies       LMP 08/01/2024 (Within Days)     Pause for the Cause  Right patient: Yes  Right procedure/correct coil:  Yes; rTMS; cpt 05367; F8 coil.   Earplugs in place:  Yes    Procedure  Patient was seated in procedure chair. Identity and procedure was verified. Ear plugs were placed in ears and patient-specific cap was placed on head and tightened appropriately. Ruler locations were verified. Bone conducting headphones were not used. Coil was placed at F3 and stimulator was set to 85% (120% of MT). Initial train was well tolerated so 75 trains were delivered. Patient tolerated procedure well with minimal right eyebrow movement.    Motor Threshold Determination  Distance from nasion to inion: 36cm  Tragus to tragus distance: 39cm  Head circumference: 59.5cm  Distance along the vertex: 9.83cm  Distance along circumference from midline: 6.84cm  Cap to Nasion: 8cm  MT 1: F3 @ 71% on 02/06/2025    Standard LDLPFC  Frequency: 10  Train Duration: 4  Total pulses delivered: 3000  Inter-train interval: 15  Tx Loc: F3  Energy: 85% (120%MT)  Trains:  75    RDLPFC:  Frequency: 50 Hz  Number of pulses:  3                        Number of bursts: 600  Burst frequency: 5 Hz  Cycle time: 97.0sec  Total pulses delivered: 1800  Tx Loc: F4 (right side)  Energy: 60% (85% MT)   Number of Cycles: 1 trains        3/10/2025    12:15 PM 3/11/2025     1:41 PM 3/12/2025    12:27 PM   PHQ-9 SCORE   PHQ-9 Total Score MyChart 24 (Severe depression)  21 (Severe depression)   PHQ-9 Total Score 24  22 21        Patient-reported       Date MADRS QIDS PHQ-9   02/06/25 34 22 24   2/14/25  24 26   2/21/25  21 21   2/28/25  17 19   3/7/25  20 20     Plan   - Continue TMS treatments    This service provided today was under the supervising provider of the day, Steve Reyes MD, who was available if needed.    Flora Jerez, TMS Technician  [facility] Neuromodulation

## 2025-03-12 NOTE — PROGRESS NOTES
"Answers submitted by the patient for this visit:  Patient Health Questionnaire (Submitted on 1/22/2025)  If you checked off any problems, how difficult have these problems made it for you to do your work, take care of things at home, or get along with other people?: Extremely difficult  PHQ9 TOTAL SCORE: 21  Patient Health Questionnaire (G7) (Submitted on 1/18/2025)  RU 7 TOTAL SCORE: 2      Pt location: home   Provider location: clinic [facility]  Psychiatry Clinic Progress Note                                                                   Melinda Welch is a 24 year old female who goes by Melinda and uses she/her pronouns. .  PCP- No primary care provider on file.  Specialty Providers- none  Therapist- Dari Lozada at [facility]  Psychiatric Med Management Provider- Bolivar Mason  Other Mental Health Providers- [facility] worker     Referred by: psychiatrist  Referred for evaluation of:  depression     Psych critical item history suicide attempt, trauma hx, multiple psychotropic trials, and psych hospitalization        Interim History                                                                                                        4, 4     The patient is a fair historian.  Since the last visit Her mood has been lower.  Her OCD has been consistent without change.  \"I have a mental breakdown starting out with thoughts and then an anxiety attack once a month\" she wonders if she should change her dose of mood stabilizing antipsychotic.  She is finding showering every day draining.  She is exercising by walking in the dog park and trying to leave the house on a regular basis.  Her mother is concerned about the risk of seizures with TMS and has questions about the safety of the procedure    Recent Symptoms:   Depression:  depressed mood, anhedonia, low energy, and mood dysregulation  Anxiety:  panic attacks [monthly]  Panic Attack:  trouble taking deep breath, chest tightness, and fear of losing control or " going crazy     Recent Substance Use:  none reported        Social/ Family History                                  [per patient report]                                 1ea,1ea   No updtaes still living at home with parents    Medical / Surgical History                                                                                                                  Patient Active Problem List   Diagnosis    Chronic bilateral low back pain without sciatica       No past surgical history on file.     Medical Review of Systems                                                                                                    2,10   A comprehensive review of systems was performed and is negative other than noted in the HPI.    Allergy                                Seasonal allergies    Current Medications                                                                                                       Current Outpatient Medications   Medication Sig Dispense Refill    calcium carbonate (TUMS) 500 MG chewable tablet Take 2 chew tab by mouth daily.      cariprazine (VRAYLAR) 1.5 MG capsule Take 1.5 mg by mouth daily.      cholecalciferol (VITAMIN D3) 25 mcg (1000 units) capsule Take 1 capsule by mouth daily.      cyanocobalamin (VITAMIN B-12) 500 MCG tablet Take 500 mcg by mouth daily.      drospirenone-ethinyl estradiol (LUIS) 3-0.02 MG tablet Take 1 tablet by mouth daily.      fluticasone (FLONASE) 50 MCG/ACT nasal spray Spray 2 sprays in nostril daily.      Fluvoxamine Maleate (LUVOX CR) 150 MG 24 hr capsule Take 150 mg by mouth 2 times daily.      gabapentin (NEURONTIN) 300 MG/6ML oral solution Take 300 mg by mouth 2 times daily.      hydrOXYzine carlos (VISTARIL) 25 MG capsule Take 25 mg by mouth 3 times daily as needed.      lamoTRIgine (LAMICTAL) 25 MG chewable tablet Take 1 tablet by mouth daily.      propranolol (INDERAL) 20 MG tablet Take 1 tablet by mouth 3 times daily.      acetaminophen (TYLENOL) 500 MG tablet  Take 500 mg by mouth.      acetylcysteine (N-ACETYL CYSTEINE) 600 MG CAPS capsule Take 1 capsule by mouth 2 times daily.      albuterol (PROAIR HFA/PROVENTIL HFA/VENTOLIN HFA) 108 (90 Base) MCG/ACT inhaler Inhale 1-2 puffs into the lungs.      fluvoxaMINE (LUVOX) 100 MG tablet Take 1 tablet by mouth daily. (Patient not taking: Reported on 1/23/2025)      loratadine (CLARITIN) 10 MG tablet Take 10 mg by mouth daily.      metFORMIN (GLUCOPHAGE XR) 500 MG 24 hr tablet Take 1 tablet (500 mg) by mouth 2 times daily (with meals). 90 tablet 2    metFORMIN (GLUCOPHAGE) 1000 MG tablet       omeprazole (PRILOSEC) 20 MG DR capsule Take 20 mg by mouth.      polyethylene glycol (MIRALAX) powder Take 17 g by mouth daily 510 g 1    senna-docusate (AMBER-COLACE) 8.6-50 MG per tablet Take 1-2 tablets by mouth 2 times daily as needed for constipation 20 tablet 1       Vitals                                                                                                                       3, 3   There were no vitals taken for this visit.     Mental Status Exam                                                                                    9, 14 cog gs     Alertness: alert  and oriented  Appearance: casually groomed  Behavior/Demeanor: cooperative, with poor eye contact   Speech: regular rate and rhythm  Language: no problems  Psychomotor: normal or unremarkable  Mood: depressed, anxious, and irritable  Affect: restricted; was congruent to mood; was congruent to content  Thought Process/Associations: overinclusive   Thought Content:  Reports none  Denies violent ideation or suicidal ideation  Perception:  Reports none;  Denies auditory hallucinations and visual hallucinations  Insight: good  Judgment: good  Cognition: (6) does  appear grossly intact; formal cognitive testing was not done  Gait/Station and/or Muscle Strength/Tone:  un assessed in seated video visit    Labs and Data                                                                                                                        3/10/2025    12:15 PM 3/11/2025     1:41 PM 3/12/2025    12:27 PM   PHQ   PHQ-9 Total Score 24  22 21    Q9: Thoughts of better off dead/self-harm past 2 weeks Nearly every day Nearly every day More than half the days   F/U: Thoughts of suicide or self-harm Yes  Yes   F/U: Self harm-plan No  No   F/U: Self-harm action No  No   F/U: Safety concerns No  No       Patient-reported         Diagnosis and Assessment                                                                             m2, h3     Today the following issues were addressed:  Patient returns for follow-up reporting mood has been lower and that she is struggling with daily functioning.  Discussed plan to initiate transcranial magnetic stimulation and target depressive symptoms.  Reviewed risks and benefits and alternatives with patient and her parents and they plan to move forward    Plan                                                                                                                    m2, h3      1)) Major depressive disorder, recurrent, severe, OCD, R?O Borderline personality disorder  -- Medications: Continue current outpatient psychotropic medications. Consider new foundational antidepressant desvenlafaxine, clomipramine or MAOI  -- Psychotherapy: Continue DBT  -- Procedures:               - Pt is approved for an acute series of rTMS              - Coil: F8 or H1    RTC: when TMS avaialble    CRISIS NUMBERS:   Provided routinely in AVS.    Treatment Risk Statement:  The patient understands the risks, benefits, adverse effects and alternatives. Agrees to treatment with the capacity to do so. No medical contraindications to treatment. Agrees to call clinic for any problems. The patient understands to call 911 or go to the nearest ED if life threatening or urgent symptoms occur.       PROVIDER:  Kateryna Boland MD

## 2025-03-13 ENCOUNTER — OFFICE VISIT (OUTPATIENT)
Dept: PSYCHIATRY | Facility: CLINIC | Age: 25
End: 2025-03-13
Payer: COMMERCIAL

## 2025-03-13 DIAGNOSIS — F33.2 SEVERE EPISODE OF RECURRENT MAJOR DEPRESSIVE DISORDER, WITHOUT PSYCHOTIC FEATURES (H): Primary | ICD-10-CM

## 2025-03-13 ASSESSMENT — PATIENT HEALTH QUESTIONNAIRE - PHQ9
SUM OF ALL RESPONSES TO PHQ QUESTIONS 1-9: 21
SUM OF ALL RESPONSES TO PHQ QUESTIONS 1-9: 24

## 2025-03-13 NOTE — PROGRESS NOTES
Interventional Psychiatry Program  [address] [facility], [address]  [place] [address]  TMS Procedure Note   Melinda Welch MRN# [id number]  Age: 24 year old  YOB: 2000    Melinda Welch comes into clinic today at the request of, Kateryna Boland MD, ordering provider for TMS treatments.    Pre-Procedure:  History and Physical: Reviewed in medical record  Consent signed by: Melinda Welch for this course of treatment on: 02/06/2025    Clinical Narrative:  Patient tolerated treatment. Meeting aunt and uncle for dinner tonight.  Ran right side at 90%.    Indications for TMS:  MDD, recurrent, severe; 4+ medication trials (from 2+ classes) ineffective; Psychotherapy ineffective    Procedure Diagnosis:  MDD, severe, recurrent F33.2    Treatment Hx:  Treatment number this series: 24  Total lifetime treatment number: 24    Allergies   Allergen Reactions    Seasonal Allergies       LMP 08/01/2024 (Within Days)     Pause for the Cause  Right patient: Yes  Right procedure/correct coil:  Yes; rTMS; cpt 66056; F8 coil.   Earplugs in place:  Yes    Procedure  Patient was seated in procedure chair. Identity and procedure was verified. Ear plugs were placed in ears and patient-specific cap was placed on head and tightened appropriately. Ruler locations were verified. Bone conducting headphones were not used. Coil was placed at F3 and stimulator was set to 85% (120% of MT). Initial train was well tolerated so 75 trains were delivered. Patient tolerated procedure well with minimal right eyebrow movement.    Motor Threshold Determination  Distance from nasion to inion: 36cm  Tragus to tragus distance: 39cm  Head circumference: 59.5cm  Distance along the vertex: 9.83cm  Distance along circumference from midline: 6.84cm  Cap to Nasion: 8cm  MT 1: F3 @ 71% on 02/06/2025    Standard LDLPFC  Frequency: 10  Train Duration: 4  Total pulses delivered: 3000  Inter-train interval: 15  Tx Loc: F3  Energy: 85% (120%MT)  Trains:  A catheter was exchanged for a (CATHETER 6FR JL3.5 CRV 100CM RADOPQ BRAID SLCT SUP TRQ ANGIO) catheter. 75    RDLPFC:  Frequency: 50 Hz  Number of pulses:  3                        Number of bursts: 600  Burst frequency: 5 Hz  Cycle time: 97.0sec  Total pulses delivered: 1800  Tx Loc: F4 (right side)  Energy: 64% (90% MT)   Number of Cycles: 1 trains        3/11/2025     1:41 PM 3/12/2025    12:27 PM 3/13/2025     1:26 PM   PHQ-9 SCORE   PHQ-9 Total Score MyChart  21 (Severe depression)    PHQ-9 Total Score 22 21  22       Patient-reported       Date MADRS QIDS PHQ-9   02/06/25 34 22 24   2/14/25  24 26   2/21/25  21 21   2/28/25  17 19   3/7/25  20 20     Plan   - Continue TMS treatments    This service provided today was under the supervising provider of the day, NAT Sam MD, who was available if needed.    Flora Jerez, TMS Technician  [facility] Neuromodulation

## 2025-03-16 ASSESSMENT — PATIENT HEALTH QUESTIONNAIRE - PHQ9
SUM OF ALL RESPONSES TO PHQ QUESTIONS 1-9: 25
10. IF YOU CHECKED OFF ANY PROBLEMS, HOW DIFFICULT HAVE THESE PROBLEMS MADE IT FOR YOU TO DO YOUR WORK, TAKE CARE OF THINGS AT HOME, OR GET ALONG WITH OTHER PEOPLE: EXTREMELY DIFFICULT
SUM OF ALL RESPONSES TO PHQ QUESTIONS 1-9: 25

## 2025-03-17 ENCOUNTER — OFFICE VISIT (OUTPATIENT)
Dept: PSYCHIATRY | Facility: CLINIC | Age: 25
End: 2025-03-17
Payer: COMMERCIAL

## 2025-03-17 DIAGNOSIS — F33.2 SEVERE EPISODE OF RECURRENT MAJOR DEPRESSIVE DISORDER, WITHOUT PSYCHOTIC FEATURES (H): Primary | ICD-10-CM

## 2025-03-17 NOTE — PROGRESS NOTES
Interventional Psychiatry Program  [address] [facility], [address]  [place] [address]  TMS Procedure Note   Melinda Welch MRN# [id number]  Age: 24 year old  YOB: 2000    Melinda Welch comes into clinic today at the request of, Kateryna Boland MD, ordering provider for TMS treatments.    Pre-Procedure:  History and Physical: Reviewed in medical record  Consent signed by: Melinda Welch for this course of treatment on: 02/06/2025    Clinical Narrative:  Patient tolerated treatment. Called suicide hotline last night. Ran ride side at 100%.    Indications for TMS:  MDD, recurrent, severe; 4+ medication trials (from 2+ classes) ineffective; Psychotherapy ineffective    Procedure Diagnosis:  MDD, severe, recurrent F33.2    Treatment Hx:  Treatment number this series: 26  Total lifetime treatment number: 26    Allergies   Allergen Reactions    Seasonal Allergies       LMP 08/01/2024 (Within Days)     Pause for the Cause  Right patient: Yes  Right procedure/correct coil:  Yes; rTMS; cpt 52190; F8 coil.   Earplugs in place:  Yes    Procedure  Patient was seated in procedure chair. Identity and procedure was verified. Ear plugs were placed in ears and patient-specific cap was placed on head and tightened appropriately. Ruler locations were verified. Bone conducting headphones were not used. Coil was placed at F3 and stimulator was set to 85% (120% of MT). Initial train was well tolerated so 75 trains were delivered. Patient tolerated procedure well with minimal right eyebrow movement.    Motor Threshold Determination  Distance from nasion to inion: 36cm  Tragus to tragus distance: 39cm  Head circumference: 59.5cm  Distance along the vertex: 9.83cm  Distance along circumference from midline: 6.84cm  Cap to Nasion: 8cm  MT 1: F3 @ 71% on 02/06/2025    Standard LDLPFC  Frequency: 10  Train Duration: 4  Total pulses delivered: 3000  Inter-train interval: 15  Tx Loc: F3  Energy: 85% (120%MT)  Trains:  75    RDLPFC:  Frequency: 50 Hz  Number of pulses:  3                        Number of bursts: 600  Burst frequency: 5 Hz  Cycle time: 97.0sec  Total pulses delivered: 1800  Tx Loc: F4 (right side)  Energy: 71% (100% MT)   Number of Cycles: 3 trains        3/14/2025    12:30 PM 3/14/2025     1:37 PM 3/16/2025    11:51 PM   PHQ-9 SCORE   PHQ-9 Total Score MyChart 21 (Severe depression)  25 (Severe depression)   PHQ-9 Total Score 21  21 25        Patient-reported       Date MADRS QIDS PHQ-9   02/06/25 34 22 24   2/14/25  24 26   2/21/25  21 21   2/28/25  17 19   3/7/25  20 20   3/14/25  19 21     Plan   - Continue TMS treatments    This service provided today was under the supervising provider of the day, Kateryna Boland MD, who was available if needed.    Judie Garcia, TMS Technician  [facility]

## 2025-03-18 ENCOUNTER — VIRTUAL VISIT (OUTPATIENT)
Dept: PSYCHIATRY | Facility: CLINIC | Age: 25
End: 2025-03-18
Payer: COMMERCIAL

## 2025-03-18 ENCOUNTER — OFFICE VISIT (OUTPATIENT)
Dept: PSYCHIATRY | Facility: CLINIC | Age: 25
End: 2025-03-18
Payer: COMMERCIAL

## 2025-03-18 DIAGNOSIS — F33.2 SEVERE EPISODE OF RECURRENT MAJOR DEPRESSIVE DISORDER, WITHOUT PSYCHOTIC FEATURES (H): Primary | ICD-10-CM

## 2025-03-18 ASSESSMENT — ANXIETY QUESTIONNAIRES
3. WORRYING TOO MUCH ABOUT DIFFERENT THINGS: NOT AT ALL
GAD7 TOTAL SCORE: 4
1. FEELING NERVOUS, ANXIOUS, OR ON EDGE: SEVERAL DAYS
GAD7 TOTAL SCORE: 4
7. FEELING AFRAID AS IF SOMETHING AWFUL MIGHT HAPPEN: SEVERAL DAYS
GAD7 TOTAL SCORE: 4
5. BEING SO RESTLESS THAT IT IS HARD TO SIT STILL: NOT AT ALL
6. BECOMING EASILY ANNOYED OR IRRITABLE: SEVERAL DAYS
4. TROUBLE RELAXING: SEVERAL DAYS
8. IF YOU CHECKED OFF ANY PROBLEMS, HOW DIFFICULT HAVE THESE MADE IT FOR YOU TO DO YOUR WORK, TAKE CARE OF THINGS AT HOME, OR GET ALONG WITH OTHER PEOPLE?: SOMEWHAT DIFFICULT
2. NOT BEING ABLE TO STOP OR CONTROL WORRYING: NOT AT ALL
IF YOU CHECKED OFF ANY PROBLEMS ON THIS QUESTIONNAIRE, HOW DIFFICULT HAVE THESE PROBLEMS MADE IT FOR YOU TO DO YOUR WORK, TAKE CARE OF THINGS AT HOME, OR GET ALONG WITH OTHER PEOPLE: SOMEWHAT DIFFICULT
7. FEELING AFRAID AS IF SOMETHING AWFUL MIGHT HAPPEN: SEVERAL DAYS

## 2025-03-18 ASSESSMENT — PATIENT HEALTH QUESTIONNAIRE - PHQ9
SUM OF ALL RESPONSES TO PHQ QUESTIONS 1-9: 23
SUM OF ALL RESPONSES TO PHQ QUESTIONS 1-9: 23
10. IF YOU CHECKED OFF ANY PROBLEMS, HOW DIFFICULT HAVE THESE PROBLEMS MADE IT FOR YOU TO DO YOUR WORK, TAKE CARE OF THINGS AT HOME, OR GET ALONG WITH OTHER PEOPLE: EXTREMELY DIFFICULT
SUM OF ALL RESPONSES TO PHQ QUESTIONS 1-9: 23
10. IF YOU CHECKED OFF ANY PROBLEMS, HOW DIFFICULT HAVE THESE PROBLEMS MADE IT FOR YOU TO DO YOUR WORK, TAKE CARE OF THINGS AT HOME, OR GET ALONG WITH OTHER PEOPLE: EXTREMELY DIFFICULT
10. IF YOU CHECKED OFF ANY PROBLEMS, HOW DIFFICULT HAVE THESE PROBLEMS MADE IT FOR YOU TO DO YOUR WORK, TAKE CARE OF THINGS AT HOME, OR GET ALONG WITH OTHER PEOPLE: EXTREMELY DIFFICULT
SUM OF ALL RESPONSES TO PHQ QUESTIONS 1-9: 23

## 2025-03-18 NOTE — PROGRESS NOTES
Interventional Psychiatry Program  [address] [facility], [address]  [place] [address]  TMS Procedure Note   Melinda Welch MRN# [id number]  Age: 24 year old  YOB: 2000    Melinda Welch comes into clinic today at the request of, Kateryna Boland MD, ordering provider for TMS treatments.    Pre-Procedure:  History and Physical: Reviewed in medical record  Consent signed by: Melinda Welch for this course of treatment on: 02/06/2025    Clinical Narrative:  Patient tolerated treatment. Increased right side to 105%.    Indications for TMS:  MDD, recurrent, severe; 4+ medication trials (from 2+ classes) ineffective; Psychotherapy ineffective    Procedure Diagnosis:  MDD, severe, recurrent F33.2    Treatment Hx:  Treatment number this series: 27  Total lifetime treatment number: 27    Allergies   Allergen Reactions    Seasonal Allergies       LMP 08/01/2024 (Within Days)     Pause for the Cause  Right patient: Yes  Right procedure/correct coil:  Yes; rTMS; cpt 99680; F8 coil.   Earplugs in place:  Yes    Procedure  Patient was seated in procedure chair. Identity and procedure was verified. Ear plugs were placed in ears and patient-specific cap was placed on head and tightened appropriately. Ruler locations were verified. Bone conducting headphones were not used. Coil was placed at F3 and stimulator was set to 85% (120% of MT). Initial train was well tolerated so 75 trains were delivered. Patient tolerated procedure well with minimal right eyebrow movement.    Motor Threshold Determination  Distance from nasion to inion: 36cm  Tragus to tragus distance: 39cm  Head circumference: 59.5cm  Distance along the vertex: 9.83cm  Distance along circumference from midline: 6.84cm  Cap to Nasion: 8cm  MT 1: F3 @ 71% on 02/06/2025    Standard LDLPFC  Frequency: 10  Train Duration: 4  Total pulses delivered: 3000  Inter-train interval: 15  Tx Loc: F3  Energy: 85% (120%MT)  Trains: 75    RDLPFC:  Frequency: 50  Hz  Number of pulses:  3                        Number of bursts: 600  Burst frequency: 5 Hz  Cycle time: 97.0sec  Total pulses delivered: 1800  Tx Loc: F4 (right side)  Energy: 75% (105% MT)   Number of Cycles: 3 trains        3/14/2025     1:37 PM 3/16/2025    11:51 PM 3/18/2025     1:10 PM   PHQ-9 SCORE   PHQ-9 Total Score MyChart  25 (Severe depression) 23 (Severe depression)   PHQ-9 Total Score 21 25  23        Patient-reported       Date MADRS QIDS PHQ-9   02/06/25 34 22 24   2/14/25  24 26   2/21/25  21 21   2/28/25  17 19   3/7/25  20 20   3/14/25  19 21     Plan   - Continue TMS treatments    This service provided today was under the supervising provider of the day, Kateryna Boland MD, who was available if needed.    Judie Garcia, TMS Technician  [facility]

## 2025-03-19 ENCOUNTER — OFFICE VISIT (OUTPATIENT)
Dept: PSYCHIATRY | Facility: CLINIC | Age: 25
End: 2025-03-19
Payer: COMMERCIAL

## 2025-03-19 DIAGNOSIS — F33.2 SEVERE EPISODE OF RECURRENT MAJOR DEPRESSIVE DISORDER, WITHOUT PSYCHOTIC FEATURES (H): Primary | ICD-10-CM

## 2025-03-19 NOTE — PROGRESS NOTES
Interventional Psychiatry Program  [address] [facility], [address]  [place] [address]  TMS Procedure Note   Melinda Welch MRN# [id number]  Age: 24 year old  YOB: 2000    Melinda Welch comes into clinic today at the request of, Kateryna Boland MD, ordering provider for TMS treatments.    Pre-Procedure:  History and Physical: Reviewed in medical record  Consent signed by: Melinda Welch for this course of treatment on: 02/06/2025    Clinical Narrative:  Patient tolerated treatment. Increased right side to 110%. Has some errands to run today.    Indications for TMS:  MDD, recurrent, severe; 4+ medication trials (from 2+ classes) ineffective; Psychotherapy ineffective    Procedure Diagnosis:  MDD, severe, recurrent F33.2    Treatment Hx:  Treatment number this series: 28  Total lifetime treatment number: 28    Allergies   Allergen Reactions    Seasonal Allergies       LMP 08/01/2024 (Within Days)     Pause for the Cause  Right patient: Yes  Right procedure/correct coil:  Yes; rTMS; cpt 59492; F8 coil.   Earplugs in place:  Yes    Procedure  Patient was seated in procedure chair. Identity and procedure was verified. Ear plugs were placed in ears and patient-specific cap was placed on head and tightened appropriately. Ruler locations were verified. Bone conducting headphones were not used. Coil was placed at F3 and stimulator was set to 85% (120% of MT). Initial train was well tolerated so 75 trains were delivered. Patient tolerated procedure well with minimal right eyebrow movement.    Motor Threshold Determination  Distance from nasion to inion: 36cm  Tragus to tragus distance: 39cm  Head circumference: 59.5cm  Distance along the vertex: 9.83cm  Distance along circumference from midline: 6.84cm  Cap to Nasion: 8cm  MT 1: F3 @ 71% on 02/06/2025    Standard LDLPFC  Frequency: 10  Train Duration: 4  Total pulses delivered: 3000  Inter-train interval: 15  Tx Loc: F3  Energy: 85% (120%MT)  Trains:  75    RDLPFC:  Frequency: 50 Hz  Number of pulses:  3                        Number of bursts: 600  Burst frequency: 5 Hz  Cycle time: 97.0sec  Total pulses delivered: 1800  Tx Loc: F4 (right side)  Energy: 78% (110% MT)   Number of Cycles: 3 trains        3/18/2025     1:10 PM 3/18/2025     1:39 PM 3/19/2025     1:31 PM   PHQ-9 SCORE   PHQ-9 Total Score MyChart 23 (Severe depression)     PHQ-9 Total Score 23  22 22       Patient-reported       Date MADRS QIDS PHQ-9   02/06/25 34 22 24   2/14/25  24 26   2/21/25  21 21   2/28/25  17 19   3/7/25  20 20   3/14/25  19 21     Plan   - Continue TMS treatments    This service provided today was under the supervising provider of the day, Michael Child MD, who was available if needed.    Flora Jerez, TMS Technician  [facility]

## 2025-03-20 ENCOUNTER — OFFICE VISIT (OUTPATIENT)
Dept: PSYCHIATRY | Facility: CLINIC | Age: 25
End: 2025-03-20
Payer: COMMERCIAL

## 2025-03-20 DIAGNOSIS — F33.2 SEVERE EPISODE OF RECURRENT MAJOR DEPRESSIVE DISORDER, WITHOUT PSYCHOTIC FEATURES (H): Primary | ICD-10-CM

## 2025-03-20 ASSESSMENT — PATIENT HEALTH QUESTIONNAIRE - PHQ9
SUM OF ALL RESPONSES TO PHQ QUESTIONS 1-9: 22
10. IF YOU CHECKED OFF ANY PROBLEMS, HOW DIFFICULT HAVE THESE PROBLEMS MADE IT FOR YOU TO DO YOUR WORK, TAKE CARE OF THINGS AT HOME, OR GET ALONG WITH OTHER PEOPLE: EXTREMELY DIFFICULT
SUM OF ALL RESPONSES TO PHQ QUESTIONS 1-9: 22
SUM OF ALL RESPONSES TO PHQ QUESTIONS 1-9: 22
SUM OF ALL RESPONSES TO PHQ QUESTIONS 1-9: 23

## 2025-03-20 NOTE — PROGRESS NOTES
Interventional Psychiatry Program  [address] [facility], [address]  [place] [address]  TMS Procedure Note   Melinda Welch MRN# [id number]  Age: 24 year old  YOB: 2000    Melinda Welch comes into clinic today at the request of, Kateryna Boland MD, ordering provider for TMS treatments.    Pre-Procedure:  History and Physical: Reviewed in medical record  Consent signed by: Melinda Welch for this course of treatment on: 02/06/2025    Clinical Narrative:  Patient tolerated treatment. Patient states they have noticed a positive effect on their OCD symptoms since we added in the right side.    Indications for TMS:  MDD, recurrent, severe; 4+ medication trials (from 2+ classes) ineffective; Psychotherapy ineffective    Procedure Diagnosis:  MDD, severe, recurrent F33.2    Treatment Hx:  Treatment number this series: 29  Total lifetime treatment number: 29    Allergies   Allergen Reactions    Seasonal Allergies       LMP 08/01/2024 (Within Days)     Pause for the Cause  Right patient: Yes  Right procedure/correct coil:  Yes; rTMS; cpt 39655; F8 coil.   Earplugs in place:  Yes    Procedure  Patient was seated in procedure chair. Identity and procedure was verified. Ear plugs were placed in ears and patient-specific cap was placed on head and tightened appropriately. Ruler locations were verified. Bone conducting headphones were not used. Coil was placed at F3 and stimulator was set to 85% (120% of MT). Initial train was well tolerated so 75 trains were delivered. Patient tolerated procedure well with minimal right eyebrow movement.    Motor Threshold Determination  Distance from nasion to inion: 36cm  Tragus to tragus distance: 39cm  Head circumference: 59.5cm  Distance along the vertex: 9.83cm  Distance along circumference from midline: 6.84cm  Cap to Nasion: 8cm  MT 1: F3 @ 71% on 02/06/2025    Standard LDLPFC  Frequency: 10  Train Duration: 4  Total pulses delivered: 3000  Inter-train interval:  15  Tx Loc: F3  Energy: 85% (120%MT)  Trains: 75    RDLPFC:  Frequency: 50 Hz  Number of pulses:  3                        Number of bursts: 600  Burst frequency: 5 Hz  Cycle time: 97.0sec  Total pulses delivered: 1800  Tx Loc: F4 (right side)  Energy: 78% (110% MT)   Number of Cycles: 3 trains        3/18/2025     1:39 PM 3/19/2025     1:31 PM 3/20/2025    10:10 AM   PHQ-9 SCORE   PHQ-9 Total Score MyChart   22 (Severe depression)   PHQ-9 Total Score 22 22 22        Patient-reported       Date MADRS QIDS PHQ-9   02/06/25 34 22 24   2/14/25  24 26   2/21/25  21 21   2/28/25  17 19   3/7/25  20 20   3/14/25  19 21     Plan   - Continue TMS treatments    This service provided today was under the supervising provider of the day, Kateryna Boland MD, who was available if needed.    Monserrat Padgett TMS Technician  [facility]

## 2025-03-23 ENCOUNTER — MYC MEDICAL ADVICE (OUTPATIENT)
Dept: PSYCHIATRY | Facility: CLINIC | Age: 25
End: 2025-03-23

## 2025-03-23 NOTE — PROGRESS NOTES
Clinician Contact & Progress Note  Department of Psychiatry & Behavioral Sciences  [facility]    Patient: Melinda Welch (2000)     MRN: [id number]  Date of Treatment:  Mar 18, 2025  Duration of Treatment: Start Time: 3:32 PM End Time: 4:30 PM  Provider: Rocio Iqbal M.A.  Supervisor: Abdirahman Senior, Ph.D., L.P.    People present:   Provider: Rocio Iqbal M.A.  Patient: Melinda Welch    Mode of communication: Virtual Visit Details    Type of service:  Video Visit   Video Start Time:  3:32 PM  Video End Time: 4:30 PM    Originating Location (pt. Location): Home  Distant Location (provider location):  On-site  Platform used for Video Visit: [employer]     Diagnoses:   Encounter Diagnosis   Name Primary?    Severe episode of recurrent major depressive disorder, without psychotic features (H) Yes      Assessment (current symptoms):      3/19/2025     1:31 PM 3/20/2025    10:10 AM 3/21/2025     1:41 PM   PHQ   PHQ-9 Total Score 22 22  22   Q9: Thoughts of better off dead/self-harm past 2 weeks More than half the days Nearly every day Nearly every day   F/U: Thoughts of suicide or self-harm  Yes    F/U: Self harm-plan  No    F/U: Self-harm action  No    F/U: Safety concerns  Yes        Patient-reported         2/18/2025    12:38 PM 3/4/2025     2:40 PM 3/18/2025     1:11 PM   RU-7 SCORE   Total Score 3 (minimal anxiety) 5 (mild anxiety) 4 (minimal anxiety)   Total Score 3  5  4        Patient-reported     Session content: Pt presented for follow-up in Treatment Resistant Depression clinic for psychotherapy visit with writer. Today focused on discussing coping & interpersonal skills related to family conflict and continuing discussion of career goals. Pt reported recent worsening of symptoms related to family conflict, leading to intrusive suicidal thoughts and nearly entering hospital. Pt denied current suicidal ideation or urges towards self-harm, and pt and therapist confirmed behavioral  "plan for coping with suicidal ideation to be calling the suicidal hotline first and going to the ER if in danger. Pt highlighted hurtful comment from relative as cause of recent symptoms, and pt and therapist discussed interpersonal effectiveness skills for handling difficult family interactions (e.g., using \"I\" statements about feelings rather than starting with \"you\"). Pt did not complete previous week's homework of  contacting  about job-shadowing due to difficulty getting touch. Pt and therapist brainstormed alternative concrete first steps towards career goals and agreed upon making anonymous social media post about career questions as between-session homework.     Treatment strategies:   Reviewed previous week's homework  Assessed suicidal ideation and urges towards self-harm  Reviewed behavioral plan for moments with suicidal ideation  Practiced empathetic listening and validation techniques  Discussed interpersonal effectiveness skills in context of difficult family conversations    Agreed upon making social media post requesting career information as between-session homework    Homework:  Submit anonymous social media post about career questions relating to neurodiversity/disability    Response to treatment:  Pt progress towards goals has been N/A, recent onset of treatment    Treatment Plan/ Disposition:   Continue weekly BA with writer  Next appointment: March 25, 2025  [place] with other (eg., psychiatric) treatment providers    Patient did not report any changes to medications    Mental Status Exam:  Alertness: alert  and oriented  Appearance: adequately groomed  Behavior/Demeanor: cooperative, with good  eye contact   Speech: normal and regular rate and rhythm  Language: intact and no problems. Preferred language identified as English.  Psychomotor: normal or unremarkable  Mood: description consistent with euthymia  Affect: full range; was congruent to mood; was congruent to content  Thought " "Process/Associations: unremarkable  Thought Content:  Reports none  -Suicidal ideation: denies SI  -Homicidal Ideation: does not report  Perception:  Reports none;  Denies none; intact  Insight: adequate  Judgment: fair  Cognition: does  appear grossly intact    Billing for \"Interactive Complexity\"?    No    Rocio Iqbal M.A.    Answers submitted by the patient for this visit:  Patient Health Questionnaire (Submitted on 3/18/2025)  If you checked off any problems, how difficult have these problems made it for you to do your work, take care of things at home, or get along with other people?: Extremely difficult  PHQ9 TOTAL SCORE: 23  Patient Health Questionnaire (G7) (Submitted on 3/18/2025)  RU 7 TOTAL SCORE: 4    "

## 2025-03-24 ENCOUNTER — TELEPHONE (OUTPATIENT)
Dept: PSYCHIATRY | Facility: CLINIC | Age: 25
End: 2025-03-24

## 2025-03-24 NOTE — TELEPHONE ENCOUNTER
RN spoke with pt's mother via transfer from .   Pt present for call via speaker phone. Mother reports pt is having increased SI since Friday but SI has been present for last couple weeks. Mother reported pt called suicide hotline middle of last week and pt confirmed. Mother expressed concern for high dose of Fluvoxamine per pharmacist. RN educated that this dose of Fluvoxomine, though can be higher than other doses, is appropriate for OCD indication. Mother is wondering if increase SI is due to combination of everything--TMS and medications. Pt stated she talked to XAVIER Bangura last week who reported she would talk to MD about worsening SI. (See Allied Health/Nurse Visit encounter 3/21/25). RN recommended communicating with prescribing provider as well, since our clinic does not manage/adjust these, only addresses interventions like TMS. RN stated she would ask a provider, since patient's MD is out this week, if there is any specific recommendation with continuing/discontinuing TMS. Pt's mother and patient verbally confirmed mother is priority for contacting regarding patient's care. RN also provided patient with option to see TOMAS Valera as he has sooner appointments than Dr. Boland. Pt was scheduled for virtual appt 3/26 at 0900.     See Dr. Levine response in Allied Health/Nurse Visit from 3/21/25.     RN placed call back to patient's mother.   Detailed voicemail left informing of Dr. Levine's response. Callback number provided.

## 2025-03-25 ASSESSMENT — PATIENT HEALTH QUESTIONNAIRE - PHQ9
SUM OF ALL RESPONSES TO PHQ QUESTIONS 1-9: 23
10. IF YOU CHECKED OFF ANY PROBLEMS, HOW DIFFICULT HAVE THESE PROBLEMS MADE IT FOR YOU TO DO YOUR WORK, TAKE CARE OF THINGS AT HOME, OR GET ALONG WITH OTHER PEOPLE: EXTREMELY DIFFICULT
SUM OF ALL RESPONSES TO PHQ QUESTIONS 1-9: 23

## 2025-03-26 ENCOUNTER — VIRTUAL VISIT (OUTPATIENT)
Dept: PSYCHIATRY | Facility: CLINIC | Age: 25
End: 2025-03-26
Payer: COMMERCIAL

## 2025-03-26 DIAGNOSIS — F33.2 SEVERE EPISODE OF RECURRENT MAJOR DEPRESSIVE DISORDER, WITHOUT PSYCHOTIC FEATURES (H): Primary | ICD-10-CM

## 2025-03-26 NOTE — PROGRESS NOTES
Is the patient currently in the state of [place]? YES    Visit mode:VIDEO    If the visit is dropped, the patient can be reconnected by: VIDEO VISIT: Send to e-mail at: [email or web address]    Will anyone else be joining the visit? YES: How would they like to receive their invitation? Send to e-mail at: [email or web address]      How would you like to obtain your AVS? MyChart    Are changes needed to the allergy or medication list? NO    Reason for visit: Follow Up       Answers submitted by the patient for this visit:  Patient Health Questionnaire (Submitted on 3/25/2025)  If you checked off any problems, how difficult have these problems made it for you to do your work, take care of things at home, or get along with other people?: Extremely difficult  PHQ9 TOTAL SCORE: 23            Pt location: home   Provider location: [facility]  Psychiatry Clinic Progress Note                                                                   Melinda Welch is a 24 year old female who goes by Melinda and uses she/her pronouns. .  PCP- No primary care provider on file.  Specialty Providers- none  Therapist- Dari Lozada at [facility]  Psychiatric Med Management Provider- Yamileth Mason NP at [facility],   Other Mental Health Providers- [facility] worker     Referred by: psychiatrist  Referred for evaluation of:  depression     Psych critical item history suicide attempt, trauma hx, multiple psychotropic trials, and psych hospitalization        Interim History                                                                                                        4, 4     Today In The Visit:  The patient reports meeting with Doctor Boland last year in December for an initial consultation and a follow-up in January. During these consultations, it was agreed to initiate Transcranial Magnetic Stimulation (TMS) for depression. The TMS treatment began in February, using a standard protocol on the left side of the brain. However, the  patient's depression and OCD symptoms worsened, leading to the addition of a right-sided coil. While the OCD symptoms showed some improvement, the depression continued to deteriorate. The patient experienced a particularly difficult weekend recently, with increasing suicidal thoughts and general difficulty, prompting the decision to cancel the remaining six TMS treatments after having completed 30 sessions.    The patient describes experiencing headaches as a side effect of TMS but notes only a slight improvement in depression symptoms, with scores on the PHQ-9 showing minimal change. The patient mentions a family conflict, particularly with a sister, which exacerbated feelings of downward spiral. The sister reportedly stated that the patient could not have a relationship with her due to mental illness, which was particularly distressing.    The patient's mood is generally low, and despite undergoing TMS, there has been no significant change in depression severity, which remains at a level of 10 on a scale where 10 is the most severe. The patient denies engaging in self-harm but reports pulling hair once or twice a day, primarily when anxious. Large social events remain overwhelming, and there has been no change in sleep patterns, with the patient sleeping more than 10 hours a day. The patient experienced a brief period of reduced hopelessness during the third week of TMS, but this was not sustained, and she still remains hopeless.    The patient struggles with personal hygiene, finding it draining to perform tasks such as showering and brushing teeth, although they manage to shower daily. Suicidal ideation decreased slightly during the third week of TMS but worsened subsequently. The patient reports OCD symptoms, including intrusive thoughts about harming others or pets, which are distressing. The patient is on a high dose of fluvoxamine, which was effective before TMS but became less so during treatment. Energy  levels remain low consistently, and the patient finds it difficult to enjoy activities, although there was some improvement in enjoying activities like walking, shopping, and doing makeup during TMS.    The patient is not currently working or attending school and describes a routine of waking up late, often staying in bed, and taking medication around midday. She manage to perform some house chores and walk their dog daily, which was not different from before starting TMS. The patient is engaged in Dialectical Behavior Therapy (DBT) and Acceptance and Commitment Therapy (ACT) in [facility]. She has a safety plan in place for suicidality, which includes contacting her father or a suicide hotline if needed, and contacting her therapist. The patient has a history of overdose but has not attempted in the past two years. Recently, there was an intent to overdose, but the patient managed to sleep it off.    The patient identifies low mood, low energy, and OCD symptoms as significant concerns, with intrusive thoughts being particularly troubling. She is on fluvoxamine 300 mg, lamotrigine 25 mg, Vraylar 1.5 mg, and gabapentin 600 mg as needed. The patient has not taken gabapentin during TMS. Melinda and her psychiatry prescriber are considering switching from Lamotrigine to Lithium and she would be seeing her provider next month. She reports being prediabetic, having insulin resistance, PCOS, iron-related fatigue, IBS, and acid reflux.        Recent Symptoms:   Depression:  depressed mood, anhedonia, low energy, and mood dysregulation  Anxiety:  panic attacks [monthly]  Panic Attack:  trouble taking deep breath, chest tightness, and fear of losing control or going crazy     Recent Substance Use:  none reported        Social/ Family History                                  [per patient report]                                 1ea,1ea   No updtaes still living at home with parents    Medical / Surgical History                                                                                                                   Patient Active Problem List   Diagnosis    Chronic bilateral low back pain without sciatica       No past surgical history on file.     Medical Review of Systems                                                                                                    2,10   A comprehensive review of systems was performed and is negative other than noted in the HPI.    Allergy                                Seasonal allergies    Current Medications                                                                                                       Current Outpatient Medications   Medication Sig Dispense Refill    acetaminophen (TYLENOL) 500 MG tablet Take 500 mg by mouth.      acetylcysteine (N-ACETYL CYSTEINE) 600 MG CAPS capsule Take 1 capsule by mouth 2 times daily.      albuterol (PROAIR HFA/PROVENTIL HFA/VENTOLIN HFA) 108 (90 Base) MCG/ACT inhaler Inhale 1-2 puffs into the lungs.      calcium carbonate (TUMS) 500 MG chewable tablet Take 2 chew tab by mouth daily.      cariprazine (VRAYLAR) 1.5 MG capsule Take 1.5 mg by mouth daily.      cholecalciferol (VITAMIN D3) 25 mcg (1000 units) capsule Take 1 capsule by mouth daily.      cyanocobalamin (VITAMIN B-12) 500 MCG tablet Take 500 mcg by mouth daily.      drospirenone-ethinyl estradiol (LUIS) 3-0.02 MG tablet Take 1 tablet by mouth daily.      fluticasone (FLONASE) 50 MCG/ACT nasal spray Spray 2 sprays in nostril daily.      Fluvoxamine Maleate (LUVOX CR) 150 MG 24 hr capsule Take 150 mg by mouth 2 times daily.      gabapentin (NEURONTIN) 300 MG/6ML oral solution Take 300 mg by mouth 2 times daily.      hydrOXYzine carlos (VISTARIL) 25 MG capsule Take 25 mg by mouth 3 times daily as needed.      lamoTRIgine (LAMICTAL) 25 MG chewable tablet Take 1 tablet by mouth daily.      loratadine (CLARITIN) 10 MG tablet Take 10 mg by mouth daily.      metFORMIN (GLUCOPHAGE XR)  500 MG 24 hr tablet Take 1 tablet (500 mg) by mouth 2 times daily (with meals). 90 tablet 2    metFORMIN (GLUCOPHAGE) 1000 MG tablet       omeprazole (PRILOSEC) 20 MG DR capsule Take 20 mg by mouth.      polyethylene glycol (MIRALAX) powder Take 17 g by mouth daily 510 g 1    propranolol (INDERAL) 20 MG tablet Take 1 tablet by mouth 3 times daily.      senna-docusate (AMBER-COLACE) 8.6-50 MG per tablet Take 1-2 tablets by mouth 2 times daily as needed for constipation 20 tablet 1    fluvoxaMINE (LUVOX) 100 MG tablet Take 1 tablet by mouth daily. (Patient not taking: Reported on 1/23/2025)         Vitals                                                                                                                       3, 3   LMP 08/01/2024 (Within Days)      Mental Status Exam                                                                                    9, 14 cog gs     Alertness: alert  and oriented  Appearance: casually groomed  Behavior/Demeanor: cooperative, with poor eye contact   Speech: regular rate and rhythm  Language: no problems  Psychomotor: normal or unremarkable  Mood: generally low  Affect: appropriate; was congruent to mood; was congruent to content  Thought Process/Associations: overinclusive   Thought Content:  Reports none  Denies violent ideation. Reports suicidal ideation  Perception:  Reports none;  Denies auditory hallucinations and visual hallucinations  Insight: good  Judgment: good  Cognition: (6) does  appear grossly intact; formal cognitive testing was not done  Gait/Station and/or Muscle Strength/Tone:  un assessed in seated video visit    Labs and Data                                                                                                                 Phq-9 today: 23        3/20/2025    10:10 AM 3/21/2025     1:41 PM 3/25/2025    12:04 PM   PHQ   PHQ-9 Total Score 22  22 23    Q9: Thoughts of better off dead/self-harm past 2 weeks Nearly every day Nearly every day Nearly  every day   F/U: Thoughts of suicide or self-harm Yes  Yes   F/U: Self harm-plan No  Yes   F/U: Self-harm action No  No   F/U: Safety concerns Yes  Yes       Patient-reported         Diagnosis and Assessment                                                                             m2, h3     Today the following issues were addressed:  Melinda is seen today virtually for a TRD follow-up with her dad present in the visit with a complain of worsening depression and increasing suicidal thoughts following TMS treatment. Her depression remains severe despite 30 sessions of TMS, with no significant improvement noted as there are no changes in her PHQ-9 and still in the low twenties. Despite OCD symptoms showed some improvement with the addition of right-sided TMS but was not sustained. Increased suicidal ideation reported, particularly over the last weekend due to family conflict, with a plan to overdose. These may have been as a result of mood dysregulation and perhaps ongoing depression. Her depression Likert scale remain 10 before starting TMS and after 30 sessions, when 10 is the worst depression. Currently, her persistent low mood, low energy, and OCD intrusive symptoms are identified as major concerns, with suicidal ideation being a significant issue as well. Though she does have safety plan in place and working with her therapist, I did recommend going to the emergency room if she does not feel safe. She is not inclined to finish up the remaining six treatments and based on her insurance they would not approve extention either. We discussed alternative treatment option based on Dr. Boland initial plan should TMS failed. We discussed the risks and benefits of ketamine and the different modalities of administration. She verbalized understanding and looking forward to the treatment. She denies any history of hallucination, paranoia, substance use and cardiac concerns. While we awaits insurance approval of ketamine, we  will order the ketamine routine labs and EKG.      Plan                                                                                                                    m2, h3      1)) Major depressive disorder, recurrent, severe, OCD, R?O Borderline personality disorder  -- Medications: Continue current outpatient psychotropic medications.   Consider new foundational antidepressant desvenlafaxine, clomipramine or MAOI  Consider switching from Lamotrigine to Lithium as patient is unable to increase the current dose of lamotrigine beyond 25 mg.     -- Psychotherapy: Continue DBT,and  ACT    -- Procedures:               - S/P 30 treatments of acute series of rTMS (F8 with addition of right sided)    Ketamine:  Will obtained insurance prior authorization  Dr Boland in the initial evaluation encounter had stated Intranasal ketamine consideration (I will confirmed )    Labs: EKG, CBC, BMP, HCG, UA, UDS, HEPATIC PANEL      RTC: Half way through the acute ketamine phase      CRISIS NUMBERS:   Provided routinely in AVS.    Treatment Risk Statement:  The patient understands the risks, benefits, adverse effects and alternatives. Agrees to treatment with the capacity to do so. No medical contraindications to treatment. Agrees to call clinic for any problems. The patient understands to call 911 or go to the nearest ED if life threatening or urgent symptoms occur.       Virtual Visit:  Starts: 9:09  Ends: 10:04    PROVIDER:     TOMAS Fan, CNP, DNP  [facility]  Interventional Psychiatry Program    Time based billing.  On the day of the visit:  10 minutes spent reviewing chart, past notes, prior treatments.  55 minutes spent face to face with patient.  15 minutes spent preparing note, additional communications.

## 2025-03-26 NOTE — PATIENT INSTRUCTIONS
"Today's Recommendations:  - Ketamine:  Overview:  Ketamine is a rapid acting antidepressant that can be particularly helpful in reducing suicidal thoughts.  It is very much a \"short-term\" treatment: if it works, it starts working almost immediately, but it also wears off quickly and often needs to be continued indefinitely.  There are three main forms of ketamine therapy: IV (at the hospital infusion center), injection (in our clinic) and nasal spray (a form of ketamine called esketamine, brand name Spravato, also given in our clinic).  There is some evidence that the IV might be slightly more effective, but the differences are pretty small.  There are pros and cons to each, but it's possible your insurance may not cover specific forms.  In the short term, it can give you a dissociative/\"floaty\" feeling that lasts 1-2 hours in most people - sometimes people find this to be a pleasant sensation, and other times patients can feel out of control in an unpleasant way.  We don't know which way you will experience it until we try.    The other short-term side-effect to be aware of is a rise in blood pressure and heart rate; this is not dangerous in most healthy people, but we will monitor you closely and get you treatment if you need it.    We don't fully understand the long-term side-effects of ketamine, but there are some signs it may impact your kidney and bladder so we ask about any signs of urinary symptoms.  What we do know is that for some people ketamine can suddenly stop working after long-term use, but there is no way to predict when this might happen.     Procedures:  Before we start treatment, we would like you to obtain some basic labs.  We will order these and you can have them done at any [place] lab.  Since ketamine is an anesthetic medication, you would need a ride to and from your ketamine appointment as you will not be safe to drive immediately after.  If you want to move forward with ketamine, we can " start talking to your insurance companies about approval.  Once we get approval, someone from our clinic will reach out to schedule your first appointment.  If you don't hear anything in 2 weeks, you should call the clinic to check in - our number is [phone].    Once you start treatment, we would plan for 2-3 treatments per week for the first month.  If you respond, we will then decrease to once weekly and hopefully space out further to every other week, then further if possible.      - - We agreed to initiate ketamine treatment as an alternative to TMS, as previously discussed with Doctor Boland. This is recommended due to insufficient improvement with TMS.  - Prior to starting ketamine treatment, please conduct necessary blood work and an EKG. These tests are required to ensure the patient's cardiovascular health is stable enough for ketamine treatment, as it may cause temporary increases in blood pressure. These order has been placed in the system and you can get them done at any [facility]  - Ketamine treatment will follow a structured schedule: for IV and injection routes, administer 3 times a week for 2 weeks, followed by once a week for 4 weeks, and then every other week. The intranasal route involves 2 times a week for 4 weeks, then once a week for 4 weeks, and then every other week.  - Arrange for transportation to and from ketamine treatment sessions, as the patient will not be able to drive post-treatment. A medical ride service can be utilized if necessary.  - In the event of increased suicidal ideation or safety concerns, the patient is advised to seek immediate help at the nearest hospital emergency room.  - You will be required to follow up half way of your acute phase ketamine treatment with me.     We are specifically a university and research clinic, and there are often research studies ongoing. Some of those are clinical trials of new brain stimulation treatments. Others are what we  "would call \"biomarker\" studies, where we ask you to participate in some kind of measurement while you are undergoing regular treatment.   If you are interested in hearing about research consider signing up for our research registry. This will allow researchers in our clinic to reach out if you become eligible for a study. Sign up today at [email or web address]    In some cases, a clinical trial is the only way to get access to an advanced treatment that is not covered by your insurance. Usually, we will talk about this in your visit if it is an option.      Patient Education       General Information:  Our Treatment-Resistant Disorders/Interventional Psychiatry clinic is what is often called a \"consultation\" or \"tertiary care\" clinic. That means we do not see patients long-term for medication management or talk therapy. We offer thorough evaluations, recommendations about medications/therapies you may have not yet tried, and in some cases, brain stimulation or office-based treatments. If you are likely to benefit from one of those advanced treatments, we will have talked about it today. Once that treatment is complete, we will see you once or twice afterwards to check in, and then you will return to getting ongoing psychiatric care from whomever you were seeing before you came for your evaluation with us. If for some reason you no longer have access to that clinician, we can help with a referral to our main [facility]. The only patients we see long-term are patients with implanted medical devices that require ongoing monitoring and programming.     Our recommendations almost always include some kind of cognitive-behavioral therapy (CBT) if you are not getting it already. Brain and nerve stimulation treatments work best when combined with certain talk therapies. We make these recommendations because we strongly believe that, without the therapy piece, most people will not get better, or will " have limited clinical benefit. It is often difficult or inconvenient to add therapy to an already busy schedule, but it is also necessary.    It is important to remember that, like all mental health treatments, our interventional therapies are not perfect. About one third of people will not feel better after treatment, and even when they work, they do not take away the symptoms entirely.If we have recommended something above, it means we think there is a better than even chance of it working, but things are never guaranteed. This is another reason we usually recommend CBT along with our advanced treatments -- it can address the symptoms that remain after the stimulation/ketamine treatment.       While we are waiting to implement the recommendations you and I have discussed, you should know what to do if your symptoms worsen. A variety of crisis numbers/resources are available. They include:    CRISIS GENERAL NUMBERS   3-384-IPNKEGP (1-789.449.1112)  OR  911     CRISIS INTERVENTION TEAM (CIT) - this is a POLICE UNIT, specially trained.  This website has information for all of [place]'s CITs. Lays out which areas have this team.  [email or web address]  However, one can just call 911 and ask for this special team.   If police need to be called, DO ask for this team.    CRISIS MOBILE TEAMS  [and see end of this phrase*]  [facility] 24[facility]:    [phone]  (Adults > 17yo)    [phone]  (Child   < 16yo)                                       FRONT DOOR (during the day could call)   [phone]    [facility] -[phone] (Adult)  [phone] (Child)     [facility] -[phone] (Adult and Child)     [facility] -[phone] ([employer] mobile crisis team; Adult and Child; 24hr)     [facility] -[phone] (Adult and Child)     CRISIS HOUSING  [address]        "       [phone]  [facility]                                [address]                       [phone]  [facility]                               [address]     [phone]   [facility]  [address]                [phone]    [facility] EMERGENCY NUMBERS    Crisis Connection:                                [phone]  [facility]:     [phone]  Crisis Intervention:                                [phone] or [phone]   COPE: Mobile Team 24hrs/7days:    [phone]  (Adults > 17yo)                                                                            [phone]  (Child   < 18yo)  [facility]        [phone] 24/7 line and Mobile Team   [address] [place] [address]  DROP IN:  M-F: 8:00 am - 7:00 pm  Sat: 11:00 am - 3:00 pm   Sun: Closed     WALK-IN COUNSELING:  [facility]       [phone]    [address]:   M, W, F:  1:00-3:00PM    M-Th:  6:30-8:30PM    TRANS and LGBT  Call [employer] at [phone]. This service is staffed by trans people 24/7.   LGBT youth <24 contemplating suicide, call the [employer] [phone].    POISON CONTROL CENTER  [phone]    OR  go to nearest ER    CHILD  \"[facility] has a needs assessment team who will do an intake evaluation. Based on the results of the intake they will recommended inpatient admission, partial hospitalization, intensive outpatient or outpatient care. The number is [phone]. \"    CRISIS TEXT LINE  Http://www.crisistextline.org  FREE SUPPORT AT YOUR FINGERTIPS, 24/7  Crisis Text Line serves anyone, in any type of crisis, providing access to free, 24/7 support and information via the medium people already use and trust: text. Here s how it works:  1)  Text 152931 from anywhere in the [place], anytime, " "about any type of crisis.  2)  A live, trained Crisis Counselor receives the text and responds quickly.      The volunteer Crisis Counselor will help you move from a 'hot moment to a cool moment'    [facility] EMERGENCY NUMBERS    Crisis Connection:                                [phone]  [facility]:              [phone]  Crisis Intervention:                                [phone] or [phone]   COPE: Mobile Team 24hrs/7days:    [phone]  (Adults > 17yo)                                                                            [phone]  (Child   < 16yo)  [facility]        [phone]  CALL 24 hours a day.  [address] [place] [address]  DROP IN:  M-F: 8:00 am - 7:00 pm  Sat: 11:00 am - 3:00 pm   Sun: Closed    WALK-IN COUNSELIN)  [facility]                                  [phone]        [facility] [address] [place] [address] Ave:                  M, W:      5:00-7:00PM       2)  [facility]                            [phone]        [facility], [address] [facility]                T, Th:      6:00-8:00PM    TRANS and LGBT  Call [employer] at [phone]. This service is staffed by trans people .   LGBT youth <24 contemplating suicide, call the [employer] [phone].    POISON CONTROL CENTER  [phone]    OR  go to nearest ER    CHILD  \"[facility] has a needs assessment team who will do an intake evaluation. Based on the results of the intake they will recommended inpatient admission, partial hospitalization, intensive outpatient or outpatient care. The number is [phone] or [phone]. \"    CRISIS TEXT LINE  Http://www.crisistextline.org  FREE SUPPORT AT YOUR FINGERTIPS,   Crisis Text Line serves anyone, in any type of crisis, providing access to free,  support and information via the medium people already use and trust: text. Here s how it works:  1)  Text 991-632 from anywhere in the " [place], anytime, about any type of crisis.  2)  A live, trained Crisis Counselor receives the text and responds quickly.      The volunteer Crisis Counselor will help you move from a 'hot moment to a cool moment'      * A Community Paramedic (CP) is an advanced paramedic that works to increase access to primary and preventive care and decrease use of emergency departments, which in turn decreases health care costs. Among other things, CPs may play a key role in providing follow-up services after a hospital discharge to prevent hospital readmission. CPs can provide health assessments, chronic disease monitoring and education, medication management, immunizations and vaccinations, laboratory specimen collection, hospital discharge follow-up care and minor medical procedures. CPs work under the direction of an Ambulance Medical Director.

## 2025-03-29 ENCOUNTER — MYC MEDICAL ADVICE (OUTPATIENT)
Dept: FAMILY MEDICINE | Facility: CLINIC | Age: 25
End: 2025-03-29
Payer: COMMERCIAL

## 2025-03-29 DIAGNOSIS — R53.83 OTHER FATIGUE: Primary | ICD-10-CM

## 2025-03-31 ENCOUNTER — TELEPHONE (OUTPATIENT)
Dept: PSYCHIATRY | Facility: CLINIC | Age: 25
End: 2025-03-31

## 2025-03-31 NOTE — TELEPHONE ENCOUNTER
LVM for pt to call back and schedule a visit with Hans this week. Hans verbally requested this earlier today.

## 2025-04-02 ENCOUNTER — TELEPHONE (OUTPATIENT)
Dept: PSYCHIATRY | Facility: CLINIC | Age: 25
End: 2025-04-02

## 2025-04-02 DIAGNOSIS — F33.2 SEVERE EPISODE OF RECURRENT MAJOR DEPRESSIVE DISORDER, WITHOUT PSYCHOTIC FEATURES (H): Primary | ICD-10-CM

## 2025-04-02 NOTE — TELEPHONE ENCOUNTER
Writer called patient to discuss IM ketamine per Dr. Boland.  Received voicemail, left message asking for a return call.  Clinic number provided.

## 2025-04-03 ENCOUNTER — LAB (OUTPATIENT)
Dept: LAB | Facility: CLINIC | Age: 25
End: 2025-04-03
Payer: COMMERCIAL

## 2025-04-03 DIAGNOSIS — R53.83 OTHER FATIGUE: ICD-10-CM

## 2025-04-03 DIAGNOSIS — F33.2 SEVERE EPISODE OF RECURRENT MAJOR DEPRESSIVE DISORDER, WITHOUT PSYCHOTIC FEATURES (H): ICD-10-CM

## 2025-04-03 LAB
ALBUMIN UR-MCNC: NEGATIVE MG/DL
AMORPH CRY #/AREA URNS HPF: ABNORMAL /HPF
APPEARANCE UR: ABNORMAL
BACTERIA #/AREA URNS HPF: ABNORMAL /HPF
BASOPHILS # BLD AUTO: 0 10E3/UL (ref 0–0.2)
BASOPHILS NFR BLD AUTO: 1 %
BILIRUB UR QL STRIP: NEGATIVE
COLOR UR AUTO: YELLOW
EOSINOPHIL # BLD AUTO: 0.1 10E3/UL (ref 0–0.7)
EOSINOPHIL NFR BLD AUTO: 1 %
ERYTHROCYTE [DISTWIDTH] IN BLOOD BY AUTOMATED COUNT: 15.6 % (ref 10–15)
GLUCOSE UR STRIP-MCNC: NEGATIVE MG/DL
HCT VFR BLD AUTO: 41.3 % (ref 35–47)
HGB BLD-MCNC: 13.8 G/DL (ref 11.7–15.7)
HGB UR QL STRIP: ABNORMAL
IMM GRANULOCYTES # BLD: 0 10E3/UL
IMM GRANULOCYTES NFR BLD: 0 %
KETONES UR STRIP-MCNC: NEGATIVE MG/DL
LEUKOCYTE ESTERASE UR QL STRIP: NEGATIVE
LYMPHOCYTES # BLD AUTO: 1.1 10E3/UL (ref 0.8–5.3)
LYMPHOCYTES NFR BLD AUTO: 21 %
MCH RBC QN AUTO: 28.1 PG (ref 26.5–33)
MCHC RBC AUTO-ENTMCNC: 33.4 G/DL (ref 31.5–36.5)
MCV RBC AUTO: 84 FL (ref 78–100)
MONOCYTES # BLD AUTO: 0.4 10E3/UL (ref 0–1.3)
MONOCYTES NFR BLD AUTO: 8 %
NEUTROPHILS # BLD AUTO: 3.7 10E3/UL (ref 1.6–8.3)
NEUTROPHILS NFR BLD AUTO: 69 %
NITRATE UR QL: NEGATIVE
PH UR STRIP: 7 [PH] (ref 5–7)
PLATELET # BLD AUTO: 289 10E3/UL (ref 150–450)
RBC # BLD AUTO: 4.91 10E6/UL (ref 3.8–5.2)
RBC #/AREA URNS AUTO: ABNORMAL /HPF
SP GR UR STRIP: 1.02 (ref 1–1.03)
SQUAMOUS #/AREA URNS AUTO: ABNORMAL /LPF
UROBILINOGEN UR STRIP-ACNC: 0.2 E.U./DL
WBC # BLD AUTO: 5.4 10E3/UL (ref 4–11)
WBC #/AREA URNS AUTO: ABNORMAL /HPF

## 2025-04-06 ENCOUNTER — NURSE TRIAGE (OUTPATIENT)
Dept: NURSING | Facility: CLINIC | Age: 25
End: 2025-04-06
Payer: COMMERCIAL

## 2025-04-06 NOTE — TELEPHONE ENCOUNTER
Nurse Triage SBAR    Is this a 2nd Level Triage? NO    Situation:  lightheaded/dizzy    Background:  Pt reports she has had these symptoms for the last 6 months but feels they have gotten worse. Reports she had a apt in January and they found her Ferritin to be low and so pt was started on a iron supplement.     Assessment:  Pt reports having to sleep long 12-14 hr stretches due to not having any energy. Reports fatigue, lightheaded/dizzy episodes, and occasional blurred vision. Pt reports vision is normal at this time and she is dizzy but still able to get up to the bathroom just fine. Reports her symptoms are affecting her everyday life.     Protocol Recommended Disposition:   See PCP Within 24 Hours    Recommendation:  Advised PCP or [facility] within 24 hrs. Writer got pt over to scheduling after completing triage.  Protocol and care advice reviewed. Advised to call back with any new or worsening signs, symptoms, concerns, or questions. They verbalized understanding and agreed to follow advice given.    Reason for Disposition   Taking a medicine that could cause dizziness (e.g., blood pressure medications, diuretics)    Additional Information   Negative: SEVERE difficulty breathing (e.g., struggling for each breath, speaks in single words)   Negative: [1] Difficulty breathing or swallowing AND [2] started suddenly after medicine, an allergic food or bee sting   Negative: Shock suspected (e.g., cold/pale/clammy skin, too weak to stand, low BP, rapid pulse)   Negative: Difficult to awaken or acting confused (e.g., disoriented, slurred speech)   Negative: [1] Weakness (i.e., paralysis, loss of muscle strength) of the face, arm or leg on one side of the body AND [2] sudden onset AND [3] present now   Negative: [1] Numbness (i.e., loss of sensation) of the face, arm or leg on one side of the body AND [2] sudden onset AND [3] present now   Negative: [1] Loss of speech or garbled speech AND [2] sudden onset AND [3] present  "now   Negative: Overdose (accidental or intentional) of medications   Negative: [1] Fainted > 15 minutes ago AND [2] still feels too weak or dizzy to stand   Negative: Sounds like a life-threatening emergency to the triager   Negative: Heart beating < 50 beats per minute OR > 140 beats per minute   Negative: Difficulty breathing   Negative: SEVERE dizziness (e.g., unable to stand, requires support to walk, feels like passing out now)   Negative: Extra heartbeats, irregular heart beating, or heart is beating very fast  (i.e., \"palpitations\")   Negative: [1] Drinking very little AND [2] dehydration suspected (e.g., no urine > 12 hours, very dry mouth, very lightheaded)   Negative: [1] Weakness (i.e., paralysis, loss of muscle strength) of the face, arm / hand, or leg / foot on one side of the body AND [2] sudden onset AND [3] brief (now gone)   Negative: [1] Numbness (i.e., loss of sensation) of the face, arm / hand, or leg / foot on one side of the body AND [2] sudden onset AND [3] brief (now gone)   Negative: [1] Loss of speech or garbled speech AND [2] sudden onset AND [3] brief (now gone)   Negative: Patient sounds very sick or weak to the triager   Negative: Loss of vision or double vision  (Exception: Similar to previous migraines.)   Negative: [1] Fever > 103 F (39.4 C) AND [2] not able to get the fever down using Fever Care Advice   Negative: [1] Fever > 101 F (38.3 C) AND [2] age > 60 years   Negative: [1] Dizziness caused by heat exposure, sudden standing, or poor fluid intake AND [2] no improvement after 2 hours of rest and fluids   Negative: [1] Fever > 100.0 F (37.8 C) AND [2] diabetes mellitus or weak immune system (e.g., HIV positive, cancer chemo, splenectomy, organ transplant, chronic steroids)   Negative: [1] Fever > 100.0 F (37.8 C) AND [2] bedridden (e.g., CVA, chronic illness, recovering from surgery)   Negative: [1] MODERATE dizziness (e.g., interferes with normal activities) AND [2] has NOT been " evaluated by doctor (or NP/PA) for this  (Exception: Dizziness caused by heat exposure, sudden standing, or poor fluid intake.)   Negative: Fever present > 3 days (72 hours)    Protocols used: Dizziness - [id number]

## 2025-04-07 ENCOUNTER — OFFICE VISIT (OUTPATIENT)
Dept: FAMILY MEDICINE | Facility: CLINIC | Age: 25
End: 2025-04-07
Payer: COMMERCIAL

## 2025-04-07 VITALS
OXYGEN SATURATION: 97 % | DIASTOLIC BLOOD PRESSURE: 90 MMHG | SYSTOLIC BLOOD PRESSURE: 114 MMHG | TEMPERATURE: 98.3 F | BODY MASS INDEX: 33.45 KG/M2 | WEIGHT: 201 LBS | RESPIRATION RATE: 18 BRPM | HEART RATE: 101 BPM

## 2025-04-07 DIAGNOSIS — Z23 ENCOUNTER FOR IMMUNIZATION: ICD-10-CM

## 2025-04-07 DIAGNOSIS — R53.83 OTHER FATIGUE: Primary | ICD-10-CM

## 2025-04-07 LAB
HCV AB SERPL QL IA: NONREACTIVE
HIV 1+2 AB+HIV1 P24 AG SERPL QL IA: NONREACTIVE
MONOCYTES NFR BLD AUTO: NEGATIVE %
RHEUMATOID FACT SERPL-ACNC: <10 IU/ML

## 2025-04-07 PROCEDURE — 87389 HIV-1 AG W/HIV-1&-2 AB AG IA: CPT | Performed by: NURSE PRACTITIONER

## 2025-04-07 PROCEDURE — 86803 HEPATITIS C AB TEST: CPT | Performed by: NURSE PRACTITIONER

## 2025-04-07 PROCEDURE — 3080F DIAST BP >= 90 MM HG: CPT | Performed by: NURSE PRACTITIONER

## 2025-04-07 PROCEDURE — 86618 LYME DISEASE ANTIBODY: CPT | Performed by: NURSE PRACTITIONER

## 2025-04-07 PROCEDURE — 3074F SYST BP LT 130 MM HG: CPT | Performed by: NURSE PRACTITIONER

## 2025-04-07 PROCEDURE — 86431 RHEUMATOID FACTOR QUANT: CPT | Performed by: NURSE PRACTITIONER

## 2025-04-07 PROCEDURE — 36415 COLL VENOUS BLD VENIPUNCTURE: CPT | Performed by: NURSE PRACTITIONER

## 2025-04-07 PROCEDURE — 91320 SARSCV2 VAC 30MCG TRS-SUC IM: CPT | Performed by: NURSE PRACTITIONER

## 2025-04-07 PROCEDURE — 1126F AMNT PAIN NOTED NONE PRSNT: CPT | Performed by: NURSE PRACTITIONER

## 2025-04-07 PROCEDURE — 99213 OFFICE O/P EST LOW 20 MIN: CPT | Mod: 25 | Performed by: NURSE PRACTITIONER

## 2025-04-07 PROCEDURE — 86308 HETEROPHILE ANTIBODY SCREEN: CPT | Performed by: NURSE PRACTITIONER

## 2025-04-07 PROCEDURE — 90480 ADMN SARSCOV2 VAC 1/ONLY CMP: CPT | Performed by: NURSE PRACTITIONER

## 2025-04-07 ASSESSMENT — PAIN SCALES - GENERAL: PAINLEVEL_OUTOF10: NO PAIN (0)

## 2025-04-07 ASSESSMENT — ENCOUNTER SYMPTOMS
FATIGUE: 1
UNEXPECTED WEIGHT CHANGE: 0
DIFFICULTY URINATING: 0
FEVER: 0
SHORTNESS OF BREATH: 0
CHILLS: 0
DYSURIA: 0

## 2025-04-07 ASSESSMENT — VISUAL ACUITY: OU: 1

## 2025-04-07 NOTE — PROGRESS NOTES
Assessment & Plan     1. Other fatigue (Primary)    Fatigue has been ongoing for 6 months  Needs 14 hours of sleep to feel rested.  Denies blurry vision at time of exam but will have it when she does not get adequate sleep (reports 2 episodes over the past 6 months). Last eye exam last month, reports normal exam at that time and no episodes of blurry vision since then.  Thinks depression could be a factor, has worsened (has a MH team that she follows closely).  Denies new medications beside fe supplement.    Patient is afebrile and in no acute distress with clear lung sounds. Mild tachycardia noted. Vision grossly intact.    Reviewed labs from last week and January/2025. Discussed the labs as noted below, pt agreeable. Agree with patient that MDD could be significant contributor. Held off on tb testing in absence of cough, hemoptysis, unintentional weight loss, night sweats.           - Hepatitis C Screen Reflex to HCV RNA Quant and Genotype  - HIV Antigen Antibody Combo  - LYME DISEASE TOTAL ANTIBODIES WITH REFLEX TO CONFIRMATION  - Mononucleosis screen  - Rheumatoid factor    2. Encounter for immunization  - COVID-19 12+ (PFIZER)        Follow-up if symptoms worsen/fail to improve. Disposition pending results.      All questions/concerns addressed. Patient stated understanding/agreement to plan of care.        Note: Chart documentation was done in part with Dragon Voice Recognition software.  Although reviewed after completion, some word and grammatical errors may remain. Please contact author for any clarification or concerns.        Gabriella Lawrence is a 24 year old, presenting for the following health issues:  Fatigue (Has been going on for 6 months but has gotten worse )      4/7/2025    10:54 AM   Additional Questions   Roomed by Zuleika   Accompanied by self         4/7/2025    10:54 AM   Patient Reported Additional Medications   Patient reports taking the following new medications none     History of  Present Illness       Reason for visit:  Fatigue  Symptom onset:  More than a month  Symptoms include:  Fatigue,tiredness only feel reted with 14 hours of sleep, blurred vision sometimes and dizziness  Symptom intensity:  Severe  Symptom progression:  Worsening  Had these symptoms before:  Yes  Has tried/received treatment for these symptoms:  Yes  Previous treatment was successful:  No   She is taking medications regularly.          Fatigue has been ongoing for 6 months  Needs 14 hours of sleep to feel rested.  Denies blurry vision at time of exam but will have it when she does not get adequate sleep (reports 2 episodes over the past 6 months). Last eye exam last month, reports normal exam at that time and no episodes of blurry vision since then.  Thinks depression could be a factor, has worsened (has a MH team that she follows closely).  Denies new medications beside fe supplement. Denies hemoptysis, unintentional weight loss, night sweats.   No other acute concerns/symptoms at time of exam.          Review of Systems   Constitutional:  Positive for fatigue. Negative for chills, fever and unexpected weight change.   Respiratory:  Negative for shortness of breath.    Cardiovascular:  Negative for chest pain and peripheral edema.   Genitourinary:  Negative for difficulty urinating and dysuria.   Constitutional, HEENT, cardiovascular, pulmonary, gi and gu systems are negative, except as otherwise noted.      Current Outpatient Medications   Medication Sig Dispense Refill    acetaminophen (TYLENOL) 500 MG tablet Take 500 mg by mouth.      acetylcysteine (N-ACETYL CYSTEINE) 600 MG CAPS capsule Take 1 capsule by mouth 2 times daily.      albuterol (PROAIR HFA/PROVENTIL HFA/VENTOLIN HFA) 108 (90 Base) MCG/ACT inhaler Inhale 1-2 puffs into the lungs.      calcium carbonate (TUMS) 500 MG chewable tablet Take 2 chew tab by mouth daily.      cariprazine (VRAYLAR) 1.5 MG capsule Take 1.5 mg by mouth daily.       cholecalciferol (VITAMIN D3) 25 mcg (1000 units) capsule Take 1 capsule by mouth daily.      cyanocobalamin (VITAMIN B-12) 500 MCG tablet Take 500 mcg by mouth daily.      drospirenone-ethinyl estradiol (LUIS) 3-0.02 MG tablet Take 1 tablet by mouth daily.      fluticasone (FLONASE) 50 MCG/ACT nasal spray Spray 2 sprays in nostril daily.      Fluvoxamine Maleate (LUVOX CR) 150 MG 24 hr capsule Take 150 mg by mouth 2 times daily.      gabapentin (NEURONTIN) 300 MG/6ML oral solution Take 300 mg by mouth 2 times daily.      hydrOXYzine carlos (VISTARIL) 25 MG capsule Take 25 mg by mouth 3 times daily as needed.      lamoTRIgine (LAMICTAL) 25 MG chewable tablet Take 1 tablet by mouth daily.      loratadine (CLARITIN) 10 MG tablet Take 10 mg by mouth daily.      metFORMIN (GLUCOPHAGE XR) 500 MG 24 hr tablet Take 1 tablet (500 mg) by mouth 2 times daily (with meals). 90 tablet 2    metFORMIN (GLUCOPHAGE) 1000 MG tablet       omeprazole (PRILOSEC) 20 MG DR capsule Take 20 mg by mouth.      polyethylene glycol (MIRALAX) powder Take 17 g by mouth daily 510 g 1    propranolol (INDERAL) 20 MG tablet Take 1 tablet by mouth 3 times daily.      senna-docusate (AMBER-COLACE) 8.6-50 MG per tablet Take 1-2 tablets by mouth 2 times daily as needed for constipation 20 tablet 1     No current facility-administered medications for this visit.             Objective      /90 (BP Location: Left arm, Patient Position: Sitting, Cuff Size: Adult Large)   Pulse 101   Temp 98.3  F (36.8  C) (Oral)   Resp 18   Wt 91.2 kg (201 lb)   LMP 03/11/2025 (Exact Date)   SpO2 97%   BMI 33.45 kg/m          Physical Exam  Constitutional:       General: She is not in acute distress.     Appearance: She is not ill-appearing.   HENT:      Right Ear: Tympanic membrane normal.      Left Ear: Tympanic membrane normal.      Nose: No rhinorrhea.      Mouth/Throat:      Pharynx: Oropharynx is clear.   Eyes:      General: Vision grossly intact. No visual  field deficit.     Extraocular Movements: Extraocular movements intact.      Pupils: Pupils are equal, round, and reactive to light.   Cardiovascular:      Rate and Rhythm: Regular rhythm. Tachycardia present.      Heart sounds: Normal heart sounds. No murmur heard.  Pulmonary:      Effort: Pulmonary effort is normal. No respiratory distress.      Breath sounds: Normal breath sounds. No wheezing or rales.   Abdominal:      General: Bowel sounds are normal.      Palpations: Abdomen is soft.      Tenderness: There is no abdominal tenderness. Negative signs include Ochoa's sign.   Musculoskeletal:      Cervical back: Neck supple.      Right lower leg: No edema.      Left lower leg: No edema.   Lymphadenopathy:      Cervical: No cervical adenopathy.   Neurological:      General: No focal deficit present.      Mental Status: She is alert.   Psychiatric:         Thought Content: Thought content normal.         Judgment: Judgment normal.              Recent Results (from the past 2 weeks)   HCG quantitative pregnancy    Collection Time: 04/03/25  2:04 PM   Result Value Ref Range    hCG Quantitative <1 <5 mIU/mL   Hepatic Panel    Collection Time: 04/03/25  2:04 PM   Result Value Ref Range    Protein Total 7.1 6.4 - 8.3 g/dL    Albumin 4.4 3.5 - 5.2 g/dL    Bilirubin Total 0.3 <=1.2 mg/dL    Alkaline Phosphatase 112 40 - 150 U/L    AST 19 0 - 45 U/L    ALT 24 0 - 50 U/L    Bilirubin Direct 0.11 0.00 - 0.30 mg/dL   Basic Metabolic Panel    Collection Time: 04/03/25  2:04 PM   Result Value Ref Range    Sodium 137 135 - 145 mmol/L    Potassium 4.1 3.4 - 5.3 mmol/L    Chloride 103 98 - 107 mmol/L    Carbon Dioxide (CO2) 21 (L) 22 - 29 mmol/L    Anion Gap 13 7 - 15 mmol/L    Urea Nitrogen 16.4 6.0 - 20.0 mg/dL    Creatinine 0.71 0.51 - 0.95 mg/dL    GFR Estimate >90 >60 mL/min/1.73m2    Calcium 9.2 8.8 - 10.4 mg/dL    Glucose 106 (H) 70 - 99 mg/dL   Vitamin D deficiency screening    Collection Time: 04/03/25  2:04 PM   Result  Value Ref Range    Vitamin D, Total (25-Hydroxy) 45 20 - 50 ng/mL   CBC with platelets and differential    Collection Time: 04/03/25  2:04 PM   Result Value Ref Range    WBC Count 5.4 4.0 - 11.0 10e3/uL    RBC Count 4.91 3.80 - 5.20 10e6/uL    Hemoglobin 13.8 11.7 - 15.7 g/dL    Hematocrit 41.3 35.0 - 47.0 %    MCV 84 78 - 100 fL    MCH 28.1 26.5 - 33.0 pg    MCHC 33.4 31.5 - 36.5 g/dL    RDW 15.6 (H) 10.0 - 15.0 %    Platelet Count 289 150 - 450 10e3/uL    % Neutrophils 69 %    % Lymphocytes 21 %    % Monocytes 8 %    % Eosinophils 1 %    % Basophils 1 %    % Immature Granulocytes 0 %    Absolute Neutrophils 3.7 1.6 - 8.3 10e3/uL    Absolute Lymphocytes 1.1 0.8 - 5.3 10e3/uL    Absolute Monocytes 0.4 0.0 - 1.3 10e3/uL    Absolute Eosinophils 0.1 0.0 - 0.7 10e3/uL    Absolute Basophils 0.0 0.0 - 0.2 10e3/uL    Absolute Immature Granulocytes 0.0 <=0.4 10e3/uL   Routine UA with micro reflex to culture    Collection Time: 04/03/25  2:10 PM    Specimen: Urine, Clean Catch   Result Value Ref Range    Color Urine Yellow Colorless, Straw, Light Yellow, Yellow    Appearance Urine Slightly Cloudy (A) Clear    Glucose Urine Negative Negative mg/dL    Bilirubin Urine Negative Negative    Ketones Urine Negative Negative mg/dL    Specific Gravity Urine 1.020 1.003 - 1.035    Blood Urine Trace (A) Negative    pH Urine 7.0 5.0 - 7.0    Protein Albumin Urine Negative Negative mg/dL    Urobilinogen Urine 0.2 0.2, 1.0 E.U./dL    Nitrite Urine Negative Negative    Leukocyte Esterase Urine Negative Negative   UA Microscopic with Reflex to Culture    Collection Time: 04/03/25  2:10 PM   Result Value Ref Range    Bacteria Urine Few (A) None Seen /HPF    RBC Urine 2-5 (A) 0-2 /HPF /HPF    WBC Urine 0-5 0-5 /HPF /HPF    Squamous Epithelials Urine Moderate (A) None Seen /LPF    Amorphous Crystals Urine Moderate (A) None Seen /HPF   Urine Drug Screen Panel    Collection Time: 04/03/25  5:27 PM   Result Value Ref Range    Amphetamines Urine  Screen Negative Screen Negative    Barbituates Urine Screen Negative Screen Negative    Benzodiazepine Urine Screen Negative Screen Negative    Cannabinoids Urine Screen Negative Screen Negative    Cocaine Urine Screen Negative Screen Negative    Fentanyl Qual Urine Screen Negative Screen Negative    Opiates Urine Screen Negative Screen Negative    PCP Urine Screen Negative Screen Negative         Signed Electronically by: TOMAS Perez CNP

## 2025-04-08 LAB — B BURGDOR IGG+IGM SER QL: 0.09

## 2025-04-14 NOTE — TELEPHONE ENCOUNTER
Writer attempted to call patient again to discuss IM Ketamine.  Received voicemail, left message asking for a return call.  Clinic number provided.

## 2025-04-23 ENCOUNTER — VIRTUAL VISIT (OUTPATIENT)
Dept: FAMILY MEDICINE | Facility: CLINIC | Age: 25
End: 2025-04-23
Payer: COMMERCIAL

## 2025-04-23 DIAGNOSIS — F32.9 TREATMENT-RESISTANT DEPRESSION: ICD-10-CM

## 2025-04-23 DIAGNOSIS — R53.83 OTHER FATIGUE: Primary | ICD-10-CM

## 2025-04-23 PROCEDURE — 96127 BRIEF EMOTIONAL/BEHAV ASSMT: CPT | Mod: 95

## 2025-04-23 PROCEDURE — 98006 SYNCH AUDIO-VIDEO EST MOD 30: CPT

## 2025-04-23 ASSESSMENT — ENCOUNTER SYMPTOMS: FATIGUE: 1

## 2025-04-23 NOTE — PROGRESS NOTES
"Melinda is a 24 year old who is being evaluated via a billable video visit.    How would you like to obtain your AVS? MyChart  If the video visit is dropped, the invitation should be resent by: Text to cell phone: [phone]  Will anyone else be joining your video visit? No      Assessment & Plan     Other fatigue  Highly suspicious that fatigue is due to worsening depression. Is completing treatment for resistant depression. Will complete follow up labs for further evaluation although a number of labs have been completed over the last couple of months and have been normal. Has been taking ferrous sulfate without much improvement to fatigue. Discussed with patient that if all labs are normal, patient to follow up with psychiatrist and a referral for sleep will be placed as patient reports frequent waking while sleeping possibly causing day time fatigue. Patient agreeable to plan.   - Ferritin; Future  - CBC with platelets; Future  - T4, free; Future  - Anti Nuclear Theresa IgG by IFA with Reflex; Future  - CRP inflammation; Future  - Erythrocyte sedimentation rate auto; Future    Treatment-resistant depression  On a number of medication for depression. Followed by psych provider through [facility] and follows closely with therapist. Declines current thoughts of self harm and feels safe at home.     The longitudinal plan of care for the diagnosis(es)/condition(s) as documented were addressed during this visit. Due to the added complexity in care, I will continue to support Melinda in the subsequent management and with ongoing continuity of care.      BMI  Estimated body mass index is 33.45 kg/m  as calculated from the following:    Height as of 1/29/25: 1.651 m (5' 5\").    Weight as of 4/7/25: 91.2 kg (201 lb).       Depression Screening Follow Up        4/23/2025     8:47 AM   PHQ   PHQ-9 Total Score 25    Q9: Thoughts of better off dead/self-harm past 2 weeks Nearly every day    F/U: Thoughts of suicide or self-harm No    F/U: " Safety concerns No        Proxy-reported     Follow Up Actions Taken  Referred patient back to mental health provider        Gabriella   Melinda is a 24 year old, presenting for the following health issues:  Fatigue      4/23/2025     8:47 AM   Additional Questions   Roomed by emily forrest     Video Start Time: 8:57 AM      Fatigue  Associated symptoms include fatigue.   History of Present Illness       Reason for visit:  Blood work wnd talk about my fatigue    She eats 0-1 servings of fruits and vegetables daily.She consumes 2 sweetened beverage(s) daily.She exercises with enough effort to increase her heart rate 20 to 29 minutes per day.  She exercises with enough effort to increase her heart rate 3 or less days per week.   She is taking medications regularly.            3/21/2025     1:41 PM 3/25/2025    12:04 PM 4/23/2025     8:47 AM   PHQ   PHQ-9 Total Score 22 23  25    Q9: Thoughts of better off dead/self-harm past 2 weeks Nearly every day Nearly every day Nearly every day    F/U: Thoughts of suicide or self-harm  Yes No    F/U: Self harm-plan  Yes    F/U: Self-harm action  No    F/U: Safety concerns  Yes No        Patient-reported    Proxy-reported     Fatigue is worsening, unable to sleep  Fatigue has been bad since July   Currently getting 8-9 hours a night  Usually wakes up once or twice a night sometimes more  Often able to fall right back to sleep     Depression symptoms have gotten worst over the last couple of months   Sees a psychiatrist who prescribes medication   Is in a treatment resistants program  No current thoughts of self harm    Has been taking ferrous sulfate over the count  Has gotten periods since starting iron   Declines heavy periods   No night sweats  No chest pain, palpitation or swelling to legs  Feels otherwise well          Objective           Vitals:  No vitals were obtained today due to virtual visit.    Physical Exam   GENERAL: alert and no distress  RESP: No audible wheeze, cough, or  visible cyanosis.    SKIN: Visible skin clear. No significant rash, abnormal pigmentation or lesions.  NEURO: Cranial nerves grossly intact.  Mentation and speech appropriate for age.  PSYCH: Appropriate affect, tone, and pace of words          Video-Visit Details    Type of service:  Video Visit   Video End Time:9:07 AM  Originating Location (pt. Location): Home    Distant Location (provider location):  On-site  Platform used for Video Visit: Jose  Signed Electronically by: TOMAS Zapata CNP

## 2025-04-28 ENCOUNTER — TELEPHONE (OUTPATIENT)
Dept: PSYCHIATRY | Facility: CLINIC | Age: 25
End: 2025-04-28

## 2025-04-28 NOTE — TELEPHONE ENCOUNTER
Writer called patient and spoke with both her and mom.  We reviewed the coset of IM ketamine.  We also reviewed IV and that [facility] does not cover this which is why Dr. Boland offered IM ketamine.  Melinda and mom would like to think about this a little more.  Melinda asked that writer check back in around 5/9.

## 2025-05-08 ENCOUNTER — LAB (OUTPATIENT)
Dept: LAB | Facility: CLINIC | Age: 25
End: 2025-05-08
Payer: COMMERCIAL

## 2025-05-08 DIAGNOSIS — R53.83 OTHER FATIGUE: ICD-10-CM

## 2025-05-08 LAB
CRP SERPL-MCNC: 7.78 MG/L
ERYTHROCYTE [DISTWIDTH] IN BLOOD BY AUTOMATED COUNT: 14.4 % (ref 10–15)
ERYTHROCYTE [SEDIMENTATION RATE] IN BLOOD BY WESTERGREN METHOD: 8 MM/HR (ref 0–20)
FERRITIN SERPL-MCNC: 38 NG/ML (ref 6–175)
HCT VFR BLD AUTO: 42.2 % (ref 35–47)
HGB BLD-MCNC: 14.1 G/DL (ref 11.7–15.7)
MCH RBC QN AUTO: 28.4 PG (ref 26.5–33)
MCHC RBC AUTO-ENTMCNC: 33.4 G/DL (ref 31.5–36.5)
MCV RBC AUTO: 85 FL (ref 78–100)
PLATELET # BLD AUTO: 279 10E3/UL (ref 150–450)
RBC # BLD AUTO: 4.97 10E6/UL (ref 3.8–5.2)
T4 FREE SERPL-MCNC: 1.19 NG/DL (ref 0.9–1.7)
WBC # BLD AUTO: 5.9 10E3/UL (ref 4–11)

## 2025-05-09 ENCOUNTER — RESULTS FOLLOW-UP (OUTPATIENT)
Dept: FAMILY MEDICINE | Facility: CLINIC | Age: 25
End: 2025-05-09

## 2025-05-09 ENCOUNTER — TELEPHONE (OUTPATIENT)
Dept: PSYCHIATRY | Facility: CLINIC | Age: 25
End: 2025-05-09

## 2025-05-09 NOTE — TELEPHONE ENCOUNTER
Writer called patient to check in regarding IM ketamine.  Received voicemail, left message asking for a return call.  Clinic number provided.

## 2025-05-18 ENCOUNTER — NURSE TRIAGE (OUTPATIENT)
Dept: NURSING | Facility: CLINIC | Age: 25
End: 2025-05-18
Payer: COMMERCIAL

## 2025-05-18 NOTE — TELEPHONE ENCOUNTER
Nurse Triage SBAR    Is this a 2nd Level Triage? YES, LICENSED PRACTITIONER REVIEW IS REQUIRED    Situation: burning and tightness in throat    Background: Patient calling to report burning sensation and tightness that radiates into her chest.  Notes this began around 1 PM today.  She reports difficulty breathing that is not severe.  She vomited X 1 at 2:45 pm. Reports starting a new medication TRILEPTAL on Friday, taking her 5th dose of the medication today.  Denies fevers.     Assessment: burning sensation and tightness in throat and chest    Protocol Recommended Disposition:   Go to ED Now (Or PCP Triage)    Recommendation: Advised patient to go to  per providers recommendations.      Paged to provider  [place] [facility] Provider consult indicated.    Reason for page: difficulty breathing    Specialty Group number: [id number]   Specialty Group: FM-Family Medicine    Initial call made to Dr. Patino by RN at 1539.   [place] [facility] Provider, Dr. Patino, connected to Nurse Advisors at 1539.    Provider recommended plan of care: Go to .    Provider Recommendation Follow Up:   Reached patient/caregiver. Informed of provider's recommendations. Patient verbalized understanding and agrees with the plan.         Does the patient meet one of the following criteria for ADS visit consideration? 16+ years old, with an [facility] PCP     Zaynab Rod RN on 5/18/2025 at 3:49 PM      Reason for Disposition   [1] Difficulty breathing AND [2] not severe    Additional Information   Negative: SEVERE difficulty breathing (e.g., struggling for each breath, speaks in single words, stridor)   Negative: Sounds like a life-threatening emergency to the triager   Negative: [1] Diagnosed strep throat AND [2] taking antibiotic AND [3] symptoms continue   Negative: Throat culture results, call about   Negative: Productive cough is main symptom   Negative: Non-productive cough is main symptom   Negative: Hoarseness is main symptom   Negative:  Runny nose is main symptom   Negative: Uvula swelling is main symptom   Negative: [1] Drooling or spitting out saliva (because can't swallow) AND [2] normal breathing   Negative: Unable to open mouth completely    Protocols used: Sore Throat-A-AH

## 2025-05-27 ASSESSMENT — PATIENT HEALTH QUESTIONNAIRE - PHQ9: SUM OF ALL RESPONSES TO PHQ QUESTIONS 1-9: 24

## 2025-05-27 ASSESSMENT — ANXIETY QUESTIONNAIRES
IF YOU CHECKED OFF ANY PROBLEMS ON THIS QUESTIONNAIRE, HOW DIFFICULT HAVE THESE PROBLEMS MADE IT FOR YOU TO DO YOUR WORK, TAKE CARE OF THINGS AT HOME, OR GET ALONG WITH OTHER PEOPLE: SOMEWHAT DIFFICULT
7. FEELING AFRAID AS IF SOMETHING AWFUL MIGHT HAPPEN: NEARLY EVERY DAY
2. NOT BEING ABLE TO STOP OR CONTROL WORRYING: NOT AT ALL
3. WORRYING TOO MUCH ABOUT DIFFERENT THINGS: NOT AT ALL
5. BEING SO RESTLESS THAT IT IS HARD TO SIT STILL: NOT AT ALL
1. FEELING NERVOUS, ANXIOUS, OR ON EDGE: SEVERAL DAYS
GAD7 TOTAL SCORE: 6
GAD7 TOTAL SCORE: 6
8. IF YOU CHECKED OFF ANY PROBLEMS, HOW DIFFICULT HAVE THESE MADE IT FOR YOU TO DO YOUR WORK, TAKE CARE OF THINGS AT HOME, OR GET ALONG WITH OTHER PEOPLE?: SOMEWHAT DIFFICULT
6. BECOMING EASILY ANNOYED OR IRRITABLE: SEVERAL DAYS
4. TROUBLE RELAXING: SEVERAL DAYS
7. FEELING AFRAID AS IF SOMETHING AWFUL MIGHT HAPPEN: NEARLY EVERY DAY

## 2025-05-28 ENCOUNTER — OFFICE VISIT (OUTPATIENT)
Dept: FAMILY MEDICINE | Facility: CLINIC | Age: 25
End: 2025-05-28
Payer: COMMERCIAL

## 2025-05-28 VITALS
OXYGEN SATURATION: 97 % | HEART RATE: 89 BPM | DIASTOLIC BLOOD PRESSURE: 83 MMHG | SYSTOLIC BLOOD PRESSURE: 121 MMHG | BODY MASS INDEX: 33.76 KG/M2 | RESPIRATION RATE: 16 BRPM | WEIGHT: 202.6 LBS | HEIGHT: 65 IN | TEMPERATURE: 97.8 F

## 2025-05-28 DIAGNOSIS — Z00.00 ROUTINE GENERAL MEDICAL EXAMINATION AT A HEALTH CARE FACILITY: Primary | ICD-10-CM

## 2025-05-28 DIAGNOSIS — J45.20 MILD INTERMITTENT ASTHMA WITHOUT COMPLICATION: ICD-10-CM

## 2025-05-28 DIAGNOSIS — F60.89 CLUSTER B PERSONALITY DISORDER (H): ICD-10-CM

## 2025-05-28 DIAGNOSIS — M26.609 TMJ (TEMPOROMANDIBULAR JOINT SYNDROME): ICD-10-CM

## 2025-05-28 DIAGNOSIS — Z11.3 SCREENING EXAMINATION FOR STI: ICD-10-CM

## 2025-05-28 DIAGNOSIS — Z79.899 HIGH RISK MEDICATION USE: ICD-10-CM

## 2025-05-28 DIAGNOSIS — R79.82 ELEVATED C-REACTIVE PROTEIN (CRP): ICD-10-CM

## 2025-05-28 PROBLEM — R73.03 PRE-DIABETES: Status: ACTIVE | Noted: 2024-01-20

## 2025-05-28 PROBLEM — K58.9 IBS (IRRITABLE BOWEL SYNDROME): Status: ACTIVE | Noted: 2020-02-20

## 2025-05-28 PROBLEM — M26.629 ARTHRALGIA OF TEMPOROMANDIBULAR JOINT: Status: ACTIVE | Noted: 2021-06-01

## 2025-05-28 PROBLEM — F63.3 TRICHOTILLOMANIA: Status: ACTIVE | Noted: 2018-05-20

## 2025-05-28 PROBLEM — G44.229 CHRONIC TENSION-TYPE HEADACHE: Status: ACTIVE | Noted: 2021-06-01

## 2025-05-28 PROBLEM — E88.819 INSULIN RESISTANCE: Status: ACTIVE | Noted: 2024-06-20

## 2025-05-28 PROBLEM — K76.0 NONALCOHOLIC FATTY LIVER: Status: ACTIVE | Noted: 2024-02-16

## 2025-05-28 PROBLEM — N94.10 DYSPAREUNIA IN FEMALE: Status: ACTIVE | Noted: 2022-04-28

## 2025-05-28 PROBLEM — K82.4 GALLBLADDER POLYP: Status: ACTIVE | Noted: 2024-02-16

## 2025-05-28 PROBLEM — E28.2 PCOS (POLYCYSTIC OVARIAN SYNDROME): Status: ACTIVE | Noted: 2022-05-24

## 2025-05-28 PROBLEM — E53.8 COBALAMIN DEFICIENCY: Status: ACTIVE | Noted: 2020-09-13

## 2025-05-28 PROBLEM — Q76.1 KLIPPEL-FEIL SYNDROME: Status: ACTIVE | Noted: 2021-03-20

## 2025-05-28 PROBLEM — Q63.1 HORSESHOE KIDNEY: Status: ACTIVE | Noted: 2018-08-20

## 2025-05-28 PROBLEM — F32.9 MAJOR DEPRESSIVE DISORDER WITHOUT PSYCHOTIC FEATURES: Status: ACTIVE | Noted: 2020-01-20

## 2025-05-28 PROBLEM — F84.0 AUTISTIC DISORDER: Status: ACTIVE | Noted: 2022-07-20

## 2025-05-28 PROBLEM — R13.19 ESOPHAGEAL DYSPHAGIA: Status: ACTIVE | Noted: 2024-02-20

## 2025-05-28 PROBLEM — K21.9 GASTROESOPHAGEAL REFLUX DISEASE WITHOUT ESOPHAGITIS: Status: ACTIVE | Noted: 2023-04-20

## 2025-05-28 LAB
EST. AVERAGE GLUCOSE BLD GHB EST-MCNC: 117 MG/DL
HBA1C MFR BLD: 5.7 % (ref 0–5.6)
T PALLIDUM AB SER QL: NONREACTIVE

## 2025-05-28 PROCEDURE — 36415 COLL VENOUS BLD VENIPUNCTURE: CPT

## 2025-05-28 PROCEDURE — 86780 TREPONEMA PALLIDUM: CPT

## 2025-05-28 PROCEDURE — 86803 HEPATITIS C AB TEST: CPT

## 2025-05-28 PROCEDURE — 87491 CHLMYD TRACH DNA AMP PROBE: CPT

## 2025-05-28 PROCEDURE — 80048 BASIC METABOLIC PNL TOTAL CA: CPT

## 2025-05-28 PROCEDURE — 86140 C-REACTIVE PROTEIN: CPT

## 2025-05-28 PROCEDURE — 80061 LIPID PANEL: CPT

## 2025-05-28 PROCEDURE — 99395 PREV VISIT EST AGE 18-39: CPT | Mod: 25

## 2025-05-28 PROCEDURE — 83036 HEMOGLOBIN GLYCOSYLATED A1C: CPT

## 2025-05-28 PROCEDURE — 90471 IMMUNIZATION ADMIN: CPT

## 2025-05-28 PROCEDURE — 87389 HIV-1 AG W/HIV-1&-2 AB AG IA: CPT

## 2025-05-28 PROCEDURE — 90677 PCV20 VACCINE IM: CPT

## 2025-05-28 PROCEDURE — 87591 N.GONORRHOEAE DNA AMP PROB: CPT

## 2025-05-28 PROCEDURE — 3074F SYST BP LT 130 MM HG: CPT

## 2025-05-28 PROCEDURE — 99213 OFFICE O/P EST LOW 20 MIN: CPT | Mod: 25

## 2025-05-28 PROCEDURE — 84443 ASSAY THYROID STIM HORMONE: CPT

## 2025-05-28 PROCEDURE — 3079F DIAST BP 80-89 MM HG: CPT

## 2025-05-28 PROCEDURE — G2211 COMPLEX E/M VISIT ADD ON: HCPCS

## 2025-05-28 PROCEDURE — 3044F HG A1C LEVEL LT 7.0%: CPT

## 2025-05-28 RX ORDER — FERROUS SULFATE 325(65) MG
TABLET ORAL
COMMUNITY
Start: 2025-01-30

## 2025-05-28 RX ORDER — FLUTICASONE PROPIONATE AND SALMETEROL 250; 50 UG/1; UG/1
1 POWDER RESPIRATORY (INHALATION) EVERY 12 HOURS
Qty: 60 EACH | Refills: 1 | Status: SHIPPED | OUTPATIENT
Start: 2025-05-28

## 2025-05-28 RX ORDER — SULFACETAMIDE SODIUM, SULFUR 100; 50 MG/G; MG/G
EMULSION TOPICAL
COMMUNITY

## 2025-05-28 SDOH — HEALTH STABILITY: PHYSICAL HEALTH: ON AVERAGE, HOW MANY MINUTES DO YOU ENGAGE IN EXERCISE AT THIS LEVEL?: 40 MIN

## 2025-05-28 SDOH — HEALTH STABILITY: PHYSICAL HEALTH: ON AVERAGE, HOW MANY DAYS PER WEEK DO YOU ENGAGE IN MODERATE TO STRENUOUS EXERCISE (LIKE A BRISK WALK)?: 6 DAYS

## 2025-05-28 ASSESSMENT — ASTHMA QUESTIONNAIRES: ACT_TOTALSCORE: 15

## 2025-05-28 ASSESSMENT — ANXIETY QUESTIONNAIRES: GAD7 TOTAL SCORE: 6

## 2025-05-28 ASSESSMENT — SOCIAL DETERMINANTS OF HEALTH (SDOH): HOW OFTEN DO YOU GET TOGETHER WITH FRIENDS OR RELATIVES?: ONCE A WEEK

## 2025-05-28 NOTE — PATIENT INSTRUCTIONS
Patient Education   Preventive Care Advice   This is general advice given by our system to help you stay healthy. However, your care team may have specific advice just for you. Please talk to your care team about your preventive care needs.  Nutrition  Eat 5 or more servings of fruits and vegetables each day.  Try wheat bread, brown rice and whole grain pasta (instead of white bread, rice, and pasta).  Get enough calcium and vitamin D. Check the label on foods and aim for 100% of the RDA (recommended daily allowance).  Lifestyle  Exercise at least 150 minutes each week  (30 minutes a day, 5 days a week).  Do muscle strengthening activities 2 days a week. These help control your weight and prevent disease.  No smoking.  Wear sunscreen to prevent skin cancer.  Have a dental exam and cleaning every 6 months.  Yearly exams  See your health care team every year to talk about:  Any changes in your health.  Any medicines your care team has prescribed.  Preventive care, family planning, and ways to prevent chronic diseases.  Shots (vaccines)   HPV shots (up to age 26), if you've never had them before.  Hepatitis B shots (up to age 59), if you've never had them before.  COVID-19 shot: Get this shot when it's due.  Flu shot: Get a flu shot every year.  Tetanus shot: Get a tetanus shot every 10 years.  Pneumococcal, hepatitis A, and RSV shots: Ask your care team if you need these based on your risk.  Shingles shot (for age 50 and up)  General health tests  Diabetes screening:  Starting at age 35, Get screened for diabetes at least every 3 years.  If you are younger than age 35, ask your care team if you should be screened for diabetes.  Cholesterol test: At age 39, start having a cholesterol test every 5 years, or more often if advised.  Bone density scan (DEXA): At age 50, ask your care team if you should have this scan for osteoporosis (brittle bones).  Hepatitis C: Get tested at least once in your life.  STIs (sexually  transmitted infections)  Before age 24: Ask your care team if you should be screened for STIs.  After age 24: Get screened for STIs if you're at risk. You are at risk for STIs (including HIV) if:  You are sexually active with more than one person.  You don't use condoms every time.  You or a partner was diagnosed with a sexually transmitted infection.  If you are at risk for HIV, ask about PrEP medicine to prevent HIV.  Get tested for HIV at least once in your life, whether you are at risk for HIV or not.  Cancer screening tests  Cervical cancer screening: If you have a cervix, begin getting regular cervical cancer screening tests starting at age 21.  Breast cancer scan (mammogram): If you've ever had breasts, begin having regular mammograms starting at age 40. This is a scan to check for breast cancer.  Colon cancer screening: It is important to start screening for colon cancer at age 45.  Have a colonoscopy test every 10 years (or more often if you're at risk) Or, ask your provider about stool tests like a FIT test every year or Cologuard test every 3 years.  To learn more about your testing options, visit:   .  For help making a decision, visit:   https://bit.ly/vn69575.  Prostate cancer screening test: If you have a prostate, ask your care team if a prostate cancer screening test (PSA) at age 55 is right for you.  Lung cancer screening: If you are a current or former smoker ages 50 to 80, ask your care team if ongoing lung cancer screenings are right for you.  For informational purposes only. Not to replace the advice of your health care provider. Copyright   2023 [facility]. All rights reserved. Clinically reviewed by the [facility]. [employer] [id number] - REV 01/24.  Learning About Depression Screening  What is depression screening?  Depression screening is a way to see if you have depression symptoms. It may be done by a doctor or counselor. It's often part of a routine  "checkup. That's because your mental health is just as important as your physical health.  Depression is a mental health condition that affects how you feel, think, and act. You may:  Have less energy.  Lose interest in your daily activities.  Feel sad and grouchy for a long time.  Depression is very common. It affects people of all ages.  Many things can lead to depression. Some people become depressed after they have a stroke or find out they have a major illness like cancer or heart disease. The death of a loved one or a breakup may lead to depression. It can run in families. Most experts believe that a combination of inherited genes and stressful life events can cause it.  What happens during screening?  You may be asked to fill out a form about your depression symptoms. You and the doctor will discuss your answers. The doctor may ask you more questions to learn more about how you think, act, and feel.  What happens after screening?  If you have symptoms of depression, your doctor will talk to you about your options.  Doctors usually treat depression with medicines or counseling. Often, combining the two works best. Many people don't get help because they think that they'll get over the depression on their own. But people with depression may not get better unless they get treatment.  The cause of depression is not well understood. There may be many factors involved. But if you have depression, it's not your fault.  A serious symptom of depression is thinking about death or suicide. If you or someone you care about talks about this or about feeling hopeless, get help right away.  It's important to know that depression can be treated. Medicine, counseling, and self-care may help.  Where can you learn more?  Go to https://www.healthwise.net/patiented  Enter T185 in the search box to learn more about \"Learning About Depression Screening.\"  Current as of: July 31, 2024  Content Version: 14.4    9141-6203 Leticia " [employer].   Care instructions adapted under license by your healthcare professional. If you have questions about a medical condition or this instruction, always ask your healthcare professional. [facility] [employer] disclaims any warranty or liability for your use of this information.

## 2025-05-28 NOTE — PROGRESS NOTES
Preventive Care Visit  [facility] ROETOMAS Parmar CNP, Nurse Practitioner Primary Care  May 28, 2025      Assessment & Plan     Routine general medical examination at a health care facility    Screening examination for STI  - Hepatitis C Screen Reflex to HCV RNA Quant and Genotype; Future  - NEISSERIA GONORRHOEA PCR; Future  - CHLAMYDIA TRACHOMATIS PCR; Future  - Treponema Abs w Reflex to RPR and Titer; Future  - HIV Antigen Antibody Combo Cascade; Future  - Hepatitis C Screen Reflex to HCV RNA Quant and Genotype  - NEISSERIA GONORRHOEA PCR  - CHLAMYDIA TRACHOMATIS PCR  - Treponema Abs w Reflex to RPR and Titer  - HIV Antigen Antibody Combo Cascade    TMJ (temporomandibular joint syndrome)  Believes TMJ is causing body discomfort. Patient states she is only covered to see a provider through [place]. Will place a referral for dental to see if they are able to see her. Physical therapy referral placed for further assistance.   - Physical Therapy  Referral; Future  - Dental Referral; Future    Elevated C-reactive protein (CRP)  Previously elevated. CRP ordered for recheck.   - CRP, inflammation; Future  - CRP, inflammation    Mild intermittent asthma without complication  Shortness of breath with specifically with exercise that is very bothersome. Reports albuterol helps. Will have patient try twice daily Advair to help with symptoms. Encouraged patient to rinse mouth after use.   - fluticasone-salmeterol (ADVAIR) 250-50 MCG/ACT inhaler; Inhale 1 puff into the lungs every 12 hours.    Cluster B personality disorder (H)  Seen by a psychiatrist, a resistant depression psychiatrist and a therapist. Declines any current thoughts of self harm or acting on those thoughts. Declines needing further assistance from myself at this time. Will be starting Ketamine treatment in July.     High risk medication use  Labs ordered below per psychiatry request.   - Hemoglobin A1c; Future  - Lipid  "Profile; Future  - Basic metabolic panel; Future  - TSH with free T4 reflex; Future  - Hemoglobin A1c  - Lipid Profile  - Basic metabolic panel  - TSH with free T4 reflex    The longitudinal plan of care for the diagnosis(es)/condition(s) as documented were addressed during this visit. Due to the added complexity in care, I will continue to support Melinda in the subsequent management and with ongoing continuity of care.    BMI  Estimated body mass index is 33.71 kg/m  as calculated from the following:    Height as of this encounter: 1.651 m (5' 5\").    Weight as of this encounter: 91.9 kg (202 lb 9.6 oz).       Depression Screening Follow Up        5/27/2025     1:20 AM   PHQ   PHQ-9 Total Score 24    Q9: Thoughts of better off dead/self-harm past 2 weeks Nearly every day   F/U: Thoughts of suicide or self-harm Yes   F/U: Self harm-plan Yes   F/U: Self-harm action No   F/U: Safety concerns No       Patient-reported       Follow Up Actions Taken  Crisis resource information provided in the After Visit Summary  Referred patient back to mental health provider      Counseling  Appropriate preventive services were addressed with this patient via screening, questionnaire, or discussion as appropriate for fall prevention, nutrition, physical activity, Tobacco-use cessation, social engagement, weight loss and cognition.  Checklist reviewing preventive services available has been given to the patient.  Reviewed patient's diet, addressing concerns and/or questions.   The patient's PHQ-9 score is consistent with severe depression. She was provided with information regarding depression.     Subjective   Melinda is a 24 year old, presenting for the following:  Physical and Pain (TMJ)        5/28/2025    11:22 AM   Additional Questions   Roomed by emily forrest        Healthy Habits:     Taking medications regularly:  0  Musculoskeletal Problem    History of Present Illness       Back Pain:  She presents for follow up of back pain. " Patient's back pain is a chronic problem.  Location of back pain:  Right lower back, left lower back, right middle of back, left middle of back, right upper back, left upper back, right side of neck, left side of neck, right shoulder, left shoulder, right buttock, left buttock, right hip, left hip, right side of waist, left side of waist and other  Description of back pain: dull ache, sharp and shooting  Back pain spreads: right thigh and left thigh    Since patient first noticed back pain, pain is: always present, but gets better and worse  Does back pain interfere with her job:  Yes       Mental Health Follow-up:  Patient presents to follow-up on Depression & Anxiety.Patient's depression since last visit has been:  Worse  The patient is not having other symptoms associated with depression.  Patient's anxiety since last visit has been:  Medium  The patient is having other symptoms associated with anxiety.  Any significant life events: job concerns, financial concerns and health concerns  Patient is not feeling anxious or having panic attacks.  Patient has no concerns about alcohol or drug use.    Diabetes:   She presents for follow up of diabetes.    She is not checking blood glucose.        She is concerned about other.    She is not experiencing numbness or burning in feet, excessive thirst, blurry vision, weight changes or redness, sores or blisters on feet.           Reason for visit:  Physical, and tightness  all ovwr my body with lower back pain, and tmj and tatum dto breathe as well    She eats 0-1 servings of fruits and vegetables daily.She consumes 2 sweetened beverage(s) daily.She exercises with enough effort to increase her heart rate 30 to 60 minutes per day.  She exercises with enough effort to increase her heart rate 6 days per week.   She is taking medications regularly.    Has a history of TMJ  Feels like all of her pain is associated with TMJ  Has been seen for TMJ in the past  Pain has been going on  for a number of years   Has a feeling of tightness/uncomfortable throughout body  Has seen physical therapy in the past which helped  Reports tightness 3/10 pain  Chewing makes things worst    Plans to do ketamine next month  Sees psychiatrist through [facility]   Will probably start lithium   Sees therapist once a week   [facility]    Feels short of breath most of the time,  Worsens when exercising  Albuterol inhaler is helpful   Did pulmonary function test a number of years ago and was normal (unable to find these records)   Uses albuterol inhaler every time she works out or walks  No chest pain  Coughing with exercising     Snoring at night   Has been told she stops breathing while she sleeps  Completed sleep study and was normal.         5/28/2025    12:08 PM   ACT Total Scores   ACT TOTAL SCORE (Goal Greater than or Equal to 20) 15   In the past 12 months, how many times did you visit the emergency room for your asthma without being admitted to the hospital? 0   In the past 12 months, how many times were you hospitalized overnight because of your asthma? 0       Advance Care Planning    Discussed advance care planning with patient; however, patient declined at this time.        5/28/2025   General Health   How would you rate your overall physical health? (!) POOR   Feel stress (tense, anxious, or unable to sleep) Not at all         5/28/2025   Nutrition   Three or more servings of calcium each day? Yes   Diet: Other   If other, please elaborate: prediabetic   How many servings of fruit and vegetables per day? (!) 0-1   How many sweetened beverages each day? (!) 2         5/28/2025   Exercise   Days per week of moderate/strenous exercise 6 days   Average minutes spent exercising at this level 40 min         5/28/2025   Social Factors   Frequency of gathering with friends or relatives Once a week   Worry food won't last until get money to buy  more Patient declined   Food not last or not have enough money for food? Patient declined   Do you have housing? (Housing is defined as stable permanent housing and does not include staying outside in a car, in a tent, in an abandoned building, in an overnight shelter, or couch-surfing.) Patient declined   Are you worried about losing your housing? Patient declined   Lack of transportation? Patient declined   Unable to get utilities (heat,electricity)? Patient declined         5/28/2025   Dental   Dentist two times every year? Yes       Today's PHQ-9 Score:       5/27/2025     1:20 AM   PHQ-9 SCORE   PHQ-9 Total Score MyChart 24 (Severe depression)   PHQ-9 Total Score 24        Patient-reported         3/25/2025    12:04 PM 4/23/2025     8:47 AM 5/27/2025     1:20 AM   PHQ   PHQ-9 Total Score 23  25  24    Q9: Thoughts of better off dead/self-harm past 2 weeks Nearly every day Nearly every day  Nearly every day   F/U: Thoughts of suicide or self-harm Yes No  Yes   F/U: Self harm-plan Yes  Yes   F/U: Self-harm action No  No   F/U: Safety concerns Yes No  No       Patient-reported    Proxy-reported             5/28/2025   Substance Use   Alcohol more than 3/day or more than 7/wk No   Do you use any other substances recreationally? No     Social History     Tobacco Use    Smoking status: Never     Passive exposure: Never    Smokeless tobacco: Never    Tobacco comments:     no exposure   Vaping Use    Vaping status: Never Used   Substance Use Topics    Alcohol use: Yes    Drug use: Never                  5/28/2025   STI Screening   New sexual partner(s) since last STI/HIV test? No     History of abnormal Pap smear: No - age 21-29 PAP every 3 years recommended             5/28/2025   Contraception/Family Planning   Questions about contraception or family planning No        Reviewed and updated as needed this visit by Provider   Tobacco  Allergies  Meds  Problems  Med Hx  Surg Hx  Fam Hx                    "  Objective    Exam  /83 (BP Location: Right arm, Patient Position: Sitting, Cuff Size: Adult Regular)   Pulse 89   Temp 97.8  F (36.6  C) (Temporal)   Resp 16   Ht 1.651 m (5' 5\")   Wt 91.9 kg (202 lb 9.6 oz)   LMP 05/25/2025 (Exact Date)   SpO2 97%   BMI 33.71 kg/m     Estimated body mass index is 33.71 kg/m  as calculated from the following:    Height as of this encounter: 1.651 m (5' 5\").    Weight as of this encounter: 91.9 kg (202 lb 9.6 oz).    Physical Exam  GENERAL: alert and no distress  EYES: Eyes grossly normal to inspection, PERRL and conjunctivae and sclerae normal  HENT: ear canals and TM's normal, nose and mouth without ulcers or lesions  NECK: no adenopathy, no asymmetry, masses, or scars  RESP: lungs clear to auscultation - no rales, rhonchi or wheezes  CV: regular rate and rhythm, normal S1 S2, no S3 or S4, no murmur, click or rub, no peripheral edema  ABDOMEN: soft, nontender, no hepatosplenomegaly, no masses and bowel sounds normal  MS: no gross musculoskeletal defects noted, no edema  SKIN: no suspicious lesions or rashes  NEURO: Normal strength and tone, mentation intact and speech normal  PSYCH: mentation appears normal and affect flat        Signed Electronically by: TOMAS Zapata CNP    "

## 2025-05-29 LAB
ANION GAP SERPL CALCULATED.3IONS-SCNC: 17 MMOL/L (ref 7–15)
BUN SERPL-MCNC: 13.2 MG/DL (ref 6–20)
C TRACH DNA SPEC QL NAA+PROBE: NEGATIVE
CALCIUM SERPL-MCNC: 9.9 MG/DL (ref 8.8–10.4)
CHLORIDE SERPL-SCNC: 101 MMOL/L (ref 98–107)
CHOLEST SERPL-MCNC: 165 MG/DL
CREAT SERPL-MCNC: 0.8 MG/DL (ref 0.51–0.95)
CRP SERPL-MCNC: 7.95 MG/L
EGFRCR SERPLBLD CKD-EPI 2021: >90 ML/MIN/1.73M2
FASTING STATUS PATIENT QL REPORTED: YES
FASTING STATUS PATIENT QL REPORTED: YES
GLUCOSE SERPL-MCNC: 95 MG/DL (ref 70–99)
HCO3 SERPL-SCNC: 20 MMOL/L (ref 22–29)
HCV AB SERPL QL IA: NONREACTIVE
HDLC SERPL-MCNC: 48 MG/DL
HIV 1+2 AB+HIV1 P24 AG SERPL QL IA: NONREACTIVE
LDLC SERPL CALC-MCNC: 93 MG/DL
N GONORRHOEA DNA SPEC QL NAA+PROBE: NEGATIVE
NONHDLC SERPL-MCNC: 117 MG/DL
POTASSIUM SERPL-SCNC: 4.4 MMOL/L (ref 3.4–5.3)
SODIUM SERPL-SCNC: 138 MMOL/L (ref 135–145)
SPECIMEN TYPE: NORMAL
SPECIMEN TYPE: NORMAL
TRIGL SERPL-MCNC: 120 MG/DL
TSH SERPL DL<=0.005 MIU/L-ACNC: 1.31 UIU/ML (ref 0.3–4.2)

## 2025-05-30 ENCOUNTER — RESULTS FOLLOW-UP (OUTPATIENT)
Dept: FAMILY MEDICINE | Facility: CLINIC | Age: 25
End: 2025-05-30

## 2025-06-03 ENCOUNTER — TELEPHONE (OUTPATIENT)
Dept: PSYCHIATRY | Facility: CLINIC | Age: 25
End: 2025-06-03

## 2025-06-03 NOTE — TELEPHONE ENCOUNTER
Pt returned call to psychiatry [facility], requesting more information about ketamine treatments. Please call pt back to discuss treatment at [phone].

## 2025-06-20 DIAGNOSIS — K21.9 GASTROESOPHAGEAL REFLUX DISEASE, UNSPECIFIED WHETHER ESOPHAGITIS PRESENT: ICD-10-CM

## 2025-06-21 RX ORDER — OMEPRAZOLE 20 MG/1
20 CAPSULE, DELAYED RELEASE ORAL DAILY
Qty: 90 CAPSULE | Refills: 1 | Status: SHIPPED | OUTPATIENT
Start: 2025-06-21

## 2025-07-09 DIAGNOSIS — E28.2 PCOS (POLYCYSTIC OVARIAN SYNDROME): ICD-10-CM

## 2025-07-09 RX ORDER — METFORMIN HYDROCHLORIDE 500 MG/1
500 TABLET, EXTENDED RELEASE ORAL 2 TIMES DAILY WITH MEALS
Qty: 180 TABLET | Refills: 0 | Status: SHIPPED | OUTPATIENT
Start: 2025-07-09

## 2025-07-14 ENCOUNTER — TELEPHONE (OUTPATIENT)
Dept: PSYCHIATRY | Facility: CLINIC | Age: 25
End: 2025-07-14

## 2025-07-14 NOTE — TELEPHONE ENCOUNTER
Pt calling in hoping to speak with Sveta. Pt stated she is willing to do nasal spray ketamine as that is what insurance will cover.

## 2025-07-15 ENCOUNTER — TELEPHONE (OUTPATIENT)
Dept: PSYCHIATRY | Facility: CLINIC | Age: 25
End: 2025-07-15

## 2025-07-15 NOTE — TELEPHONE ENCOUNTER
Patient wanting to do the nayzal spray ketamine. She is requesting to speak with nurse Katlyn. Please call at [phone].

## 2025-07-16 ENCOUNTER — MYC MEDICAL ADVICE (OUTPATIENT)
Dept: PSYCHIATRY | Facility: CLINIC | Age: 25
End: 2025-07-16

## 2025-07-17 NOTE — TELEPHONE ENCOUNTER
RN placed call to patient.   Straight to voicemail. Left message with callback number.     Detailed MyChart message sent to patient.

## 2025-07-29 ENCOUNTER — OFFICE VISIT (OUTPATIENT)
Dept: FAMILY MEDICINE | Facility: CLINIC | Age: 25
End: 2025-07-29
Payer: COMMERCIAL

## 2025-07-29 VITALS
HEIGHT: 66 IN | HEART RATE: 90 BPM | TEMPERATURE: 98 F | DIASTOLIC BLOOD PRESSURE: 80 MMHG | WEIGHT: 200 LBS | SYSTOLIC BLOOD PRESSURE: 115 MMHG | RESPIRATION RATE: 18 BRPM | OXYGEN SATURATION: 98 % | BODY MASS INDEX: 32.14 KG/M2

## 2025-07-29 DIAGNOSIS — G89.29 OTHER CHRONIC PAIN: ICD-10-CM

## 2025-07-29 DIAGNOSIS — J45.20 MILD INTERMITTENT ASTHMA WITHOUT COMPLICATION: ICD-10-CM

## 2025-07-29 DIAGNOSIS — M62.89 MUSCLE TIGHTNESS: ICD-10-CM

## 2025-07-29 DIAGNOSIS — J45.20 MILD INTERMITTENT ASTHMA WITHOUT COMPLICATION: Primary | ICD-10-CM

## 2025-07-29 DIAGNOSIS — F32.9 MAJOR DEPRESSIVE DISORDER WITHOUT PSYCHOTIC FEATURES: ICD-10-CM

## 2025-07-29 PROCEDURE — 99214 OFFICE O/P EST MOD 30 MIN: CPT

## 2025-07-29 PROCEDURE — 3074F SYST BP LT 130 MM HG: CPT

## 2025-07-29 PROCEDURE — G2211 COMPLEX E/M VISIT ADD ON: HCPCS

## 2025-07-29 PROCEDURE — 1126F AMNT PAIN NOTED NONE PRSNT: CPT

## 2025-07-29 PROCEDURE — 3079F DIAST BP 80-89 MM HG: CPT

## 2025-07-29 RX ORDER — FLUTICASONE PROPIONATE AND SALMETEROL 100; 50 UG/1; UG/1
1 POWDER RESPIRATORY (INHALATION) EVERY 12 HOURS
Qty: 60 EACH | Refills: 1 | Status: SHIPPED | OUTPATIENT
Start: 2025-07-29

## 2025-07-29 ASSESSMENT — PAIN SCALES - GENERAL: PAINLEVEL_OUTOF10: NO PAIN (0)

## 2025-07-29 ASSESSMENT — ASTHMA QUESTIONNAIRES
ACT_TOTALSCORE: 19
ACUTE_EXACERBATION_TODAY: NO
QUESTION_2 LAST FOUR WEEKS HOW OFTEN HAVE YOU HAD SHORTNESS OF BREATH: ONCE OR TWICE A WEEK
QUESTION_4 LAST FOUR WEEKS HOW OFTEN HAVE YOU USED YOUR RESCUE INHALER OR NEBULIZER MEDICATION (SUCH AS ALBUTEROL): TWO OR THREE TIMES PER WEEK
QUESTION_3 LAST FOUR WEEKS HOW OFTEN DID YOUR ASTHMA SYMPTOMS (WHEEZING, COUGHING, SHORTNESS OF BREATH, CHEST TIGHTNESS OR PAIN) WAKE YOU UP AT NIGHT OR EARLIER THAN USUAL IN THE MORNING: NOT AT ALL
QUESTION_5 LAST FOUR WEEKS HOW WOULD YOU RATE YOUR ASTHMA CONTROL: WELL CONTROLLED
ACT_TOTALSCORE: 19
QUESTION_1 LAST FOUR WEEKS HOW MUCH OF THE TIME DID YOUR ASTHMA KEEP YOU FROM GETTING AS MUCH DONE AT WORK, SCHOOL OR AT HOME: SOME OF THE TIME

## 2025-07-29 NOTE — PROGRESS NOTES
"  Assessment & Plan     Mild intermittent asthma without complication  Did not  previous Advair prescription. ACT 19, Advair ordered. Discussed rinsing mouth after use. Encouraged patient to follow up if no improvement.   - fluticasone-salmeterol (ADVAIR) 100-50 MCG/ACT inhaler; Inhale 1 puff into the lungs every 12 hours.    Other chronic pain  Chronic since 2018/19. Has completed PT which is improves symptoms slightly. Has been seen in the past a number of times for this by outside providers. Will have patient see Pain for further evaluation and treatment of symptoms.   - Pain Management  Referral; Future    Muscle tightness  See above.   - Pain Management  Referral; Future    Major depressive disorder without psychotic features  Stable, will start Ketamine next week.     The longitudinal plan of care for the diagnosis(es)/condition(s) as documented were addressed during this visit. Due to the added complexity in care, I will continue to support Melinda in the subsequent management and with ongoing continuity of care.    BMI  Estimated body mass index is 32.28 kg/m  as calculated from the following:    Height as of this encounter: 1.676 m (5' 6\").    Weight as of this encounter: 90.7 kg (200 lb).       Depression Screening Follow Up        7/28/2025     4:18 PM   PHQ   PHQ-9 Total Score 20    Q9: Thoughts of better off dead/self-harm past 2 weeks Nearly every day   F/U: Thoughts of suicide or self-harm No   F/U: Safety concerns No       Patient-reported         Subjective   Melinda is a 24 year old, presenting for the following health issues:  Asthma        7/29/2025     3:08 PM   Additional Questions   Roomed by Dora   Accompanied by self         7/29/2025     3:08 PM   Patient Reported Additional Medications   Patient reports taking the following new medications none     History of Present Illness       Back Pain:  She presents for follow up of back pain. Patient's back pain is a chronic " problem.  Location of back pain:  Right lower back, left lower back, right middle of back, left middle of back, right upper back, left upper back, right side of neck, left side of neck, right shoulder, left shoulder, right buttock, left buttock, right hip, left hip, right side of waist, left side of waist and other  Description of back pain: other  Back pain spreads: right thigh and left thigh    Since patient first noticed back pain, pain is: gradually worsening  Does back pain interfere with her job:  Yes       Reason for visit:  Asmtha and while body tightness    She eats 0-1 servings of fruits and vegetables daily.She consumes 1 sweetened beverage(s) daily.She exercises with enough effort to increase her heart rate 20 to 29 minutes per day.  She exercises with enough effort to increase her heart rate 4 days per week.   She is taking medications regularly.        Asthma      7/29/2025     3:12 PM   ACT Total Scores   ACT TOTAL SCORE (Goal Greater than or Equal to 20) 19   In the past 12 months, how many times did you visit the emergency room for your asthma without being admitted to the hospital? 0   In the past 12 months, how many times were you hospitalized overnight because of your asthma? 0     Do you have any of the following symptoms? Cough and Trouble with breathing (shortness of breath)  What makes your asthma/breathing worse?  Exercise or sports  Do you want more information about how to use your inhaler? No    Has been using albuterol more often  Helps with symptoms   Exercise and running triggers symptoms  Symptoms improve with the albuterol inhaler     Had had muscle tightness since [id number]  Has tried physical therapy with mild improvement  Has seen a spine provider who recommended intensive physical therapy  Reports feeling of tightness but does feel numbness and tingling to fingers   Tightness equal on both sides         Objective    /80 (BP Location: Right arm, Patient Position: Sitting,  "Cuff Size: Adult Large)   Pulse 90   Temp 98  F (36.7  C) (Temporal)   Resp 18   Ht 1.676 m (5' 6\")   Wt 90.7 kg (200 lb)   LMP 07/22/2025 (Exact Date)   SpO2 98%   BMI 32.28 kg/m    Body mass index is 32.28 kg/m .  Physical Exam   GENERAL: alert and no distress  RESP: lungs clear to auscultation - no rales, rhonchi or wheezes  MS: normal muscle tone and normal range of motion  NEURO: Normal strength and tone, sensory exam grossly normal, mentation intact, cranial nerves 2-12 intact, and gait normal including heel/toe/tandem walking  PSYCH: mentation appears normal and affect flat            Signed Electronically by: TOMAS Zapata CNP    "

## 2025-07-30 RX ORDER — FLUTICASONE PROPIONATE AND SALMETEROL 100; 50 UG/1; UG/1
1 POWDER RESPIRATORY (INHALATION) EVERY 12 HOURS
Qty: 60 EACH | Refills: 1 | OUTPATIENT
Start: 2025-07-30

## 2025-08-04 ENCOUNTER — PATIENT OUTREACH (OUTPATIENT)
Dept: CARE COORDINATION | Facility: CLINIC | Age: 25
End: 2025-08-04
Payer: COMMERCIAL

## 2025-08-06 ASSESSMENT — ANXIETY QUESTIONNAIRES
3. WORRYING TOO MUCH ABOUT DIFFERENT THINGS: NOT AT ALL
5. BEING SO RESTLESS THAT IT IS HARD TO SIT STILL: NOT AT ALL
GAD7 TOTAL SCORE: 2
8. IF YOU CHECKED OFF ANY PROBLEMS, HOW DIFFICULT HAVE THESE MADE IT FOR YOU TO DO YOUR WORK, TAKE CARE OF THINGS AT HOME, OR GET ALONG WITH OTHER PEOPLE?: SOMEWHAT DIFFICULT
6. BECOMING EASILY ANNOYED OR IRRITABLE: NOT AT ALL
7. FEELING AFRAID AS IF SOMETHING AWFUL MIGHT HAPPEN: SEVERAL DAYS
4. TROUBLE RELAXING: SEVERAL DAYS
2. NOT BEING ABLE TO STOP OR CONTROL WORRYING: NOT AT ALL
GAD7 TOTAL SCORE: 2
1. FEELING NERVOUS, ANXIOUS, OR ON EDGE: NOT AT ALL
IF YOU CHECKED OFF ANY PROBLEMS ON THIS QUESTIONNAIRE, HOW DIFFICULT HAVE THESE PROBLEMS MADE IT FOR YOU TO DO YOUR WORK, TAKE CARE OF THINGS AT HOME, OR GET ALONG WITH OTHER PEOPLE: SOMEWHAT DIFFICULT
GAD7 TOTAL SCORE: 2
7. FEELING AFRAID AS IF SOMETHING AWFUL MIGHT HAPPEN: SEVERAL DAYS

## 2025-08-07 ENCOUNTER — OFFICE VISIT (OUTPATIENT)
Dept: PSYCHIATRY | Facility: CLINIC | Age: 25
End: 2025-08-07
Payer: COMMERCIAL

## 2025-08-28 ASSESSMENT — SLEEP AND FATIGUE QUESTIONNAIRES
HOW LIKELY ARE YOU TO NOD OFF OR FALL ASLEEP WHILE SITTING AND TALKING TO SOMEONE: WOULD NEVER DOZE
HOW LIKELY ARE YOU TO NOD OFF OR FALL ASLEEP WHILE SITTING INACTIVE IN A PUBLIC PLACE: MODERATE CHANCE OF DOZING
HOW LIKELY ARE YOU TO NOD OFF OR FALL ASLEEP WHILE LYING DOWN TO REST IN THE AFTERNOON WHEN CIRCUMSTANCES PERMIT: MODERATE CHANCE OF DOZING
HOW LIKELY ARE YOU TO NOD OFF OR FALL ASLEEP WHILE WATCHING TV: SLIGHT CHANCE OF DOZING
HOW LIKELY ARE YOU TO NOD OFF OR FALL ASLEEP WHEN YOU ARE A PASSENGER IN A CAR FOR AN HOUR WITHOUT A BREAK: WOULD NEVER DOZE
HOW LIKELY ARE YOU TO NOD OFF OR FALL ASLEEP WHILE SITTING QUIETLY AFTER LUNCH WITHOUT ALCOHOL: WOULD NEVER DOZE
HOW LIKELY ARE YOU TO NOD OFF OR FALL ASLEEP WHILE SITTING AND READING: MODERATE CHANCE OF DOZING
HOW LIKELY ARE YOU TO NOD OFF OR FALL ASLEEP IN A CAR, WHILE STOPPED FOR A FEW MINUTES IN TRAFFIC: WOULD NEVER DOZE

## 2025-09-02 ENCOUNTER — VIRTUAL VISIT (OUTPATIENT)
Dept: ENDOCRINOLOGY | Facility: CLINIC | Age: 25
End: 2025-09-02
Payer: COMMERCIAL

## 2025-09-02 VITALS — WEIGHT: 196 LBS | HEIGHT: 65 IN | BODY MASS INDEX: 32.65 KG/M2

## 2025-09-02 DIAGNOSIS — E66.811 CLASS 1 OBESITY WITHOUT SERIOUS COMORBIDITY WITH BODY MASS INDEX (BMI) OF 33.0 TO 33.9 IN ADULT, UNSPECIFIED OBESITY TYPE: ICD-10-CM

## 2025-09-02 RX ORDER — SEMAGLUTIDE 0.5 MG/.5ML
0.5 INJECTION, SOLUTION SUBCUTANEOUS WEEKLY
Qty: 2 ML | Refills: 2 | OUTPATIENT
Start: 2025-10-02

## 2025-09-02 RX ORDER — LAMOTRIGINE 25 MG/1
1 TABLET ORAL
COMMUNITY
Start: 2025-03-09

## 2025-09-02 RX ORDER — SEMAGLUTIDE 0.25 MG/.5ML
0.25 INJECTION, SOLUTION SUBCUTANEOUS WEEKLY
Qty: 2 ML | Refills: 0 | OUTPATIENT
Start: 2025-09-02

## 2025-09-02 ASSESSMENT — PAIN SCALES - GENERAL: PAINLEVEL_OUTOF10: NO PAIN (0)

## 2025-09-03 ENCOUNTER — TELEPHONE (OUTPATIENT)
Dept: ENDOCRINOLOGY | Facility: CLINIC | Age: 25
End: 2025-09-03

## 2025-09-03 ENCOUNTER — VIRTUAL VISIT (OUTPATIENT)
Dept: ENDOCRINOLOGY | Facility: CLINIC | Age: 25
End: 2025-09-03
Payer: COMMERCIAL

## 2025-09-03 VITALS — WEIGHT: 196 LBS | HEIGHT: 65 IN | BODY MASS INDEX: 32.65 KG/M2

## 2025-09-03 DIAGNOSIS — E28.2 PCOS (POLYCYSTIC OVARIAN SYNDROME): ICD-10-CM

## 2025-09-03 DIAGNOSIS — R73.03 PRE-DIABETES: ICD-10-CM

## 2025-09-03 DIAGNOSIS — Z71.3 NUTRITIONAL COUNSELING: Primary | ICD-10-CM

## 2025-09-03 DIAGNOSIS — E66.811 CLASS 1 OBESITY: ICD-10-CM

## 2025-09-03 PROCEDURE — 1126F AMNT PAIN NOTED NONE PRSNT: CPT | Mod: 95 | Performed by: DIETITIAN, REGISTERED

## 2025-09-03 PROCEDURE — 99207 PR NO CHARGE LOS: CPT | Mod: 95 | Performed by: DIETITIAN, REGISTERED

## 2025-09-03 PROCEDURE — 97802 MEDICAL NUTRITION INDIV IN: CPT | Mod: 95 | Performed by: DIETITIAN, REGISTERED

## 2025-09-03 ASSESSMENT — PAIN SCALES - GENERAL: PAINLEVEL_OUTOF10: NO PAIN (0)

## 2025-09-04 ENCOUNTER — OFFICE VISIT (OUTPATIENT)
Dept: OBGYN | Facility: CLINIC | Age: 25
End: 2025-09-04
Payer: COMMERCIAL

## 2025-09-04 VITALS
WEIGHT: 203.4 LBS | OXYGEN SATURATION: 95 % | TEMPERATURE: 97.8 F | SYSTOLIC BLOOD PRESSURE: 135 MMHG | DIASTOLIC BLOOD PRESSURE: 85 MMHG | HEART RATE: 88 BPM | BODY MASS INDEX: 33.85 KG/M2

## 2025-09-04 DIAGNOSIS — E28.2 PCOS (POLYCYSTIC OVARIAN SYNDROME): Primary | ICD-10-CM

## 2025-09-04 DIAGNOSIS — M62.89 PELVIC FLOOR DYSFUNCTION IN FEMALE: ICD-10-CM

## 2025-09-04 DIAGNOSIS — F52.9 SEXUAL DYSFUNCTION IN FEMALE: ICD-10-CM

## 2025-09-04 RX ORDER — DROSPIRENONE AND ETHINYL ESTRADIOL 0.02-3(28)
1 KIT ORAL DAILY
Qty: 84 TABLET | Refills: 4 | Status: SHIPPED | OUTPATIENT
Start: 2025-09-04

## 2025-09-04 ASSESSMENT — PATIENT HEALTH QUESTIONNAIRE - PHQ9
5. POOR APPETITE OR OVEREATING: NOT AT ALL
SUM OF ALL RESPONSES TO PHQ QUESTIONS 1-9: 24

## 2025-09-04 ASSESSMENT — ANXIETY QUESTIONNAIRES
IF YOU CHECKED OFF ANY PROBLEMS ON THIS QUESTIONNAIRE, HOW DIFFICULT HAVE THESE PROBLEMS MADE IT FOR YOU TO DO YOUR WORK, TAKE CARE OF THINGS AT HOME, OR GET ALONG WITH OTHER PEOPLE: SOMEWHAT DIFFICULT
6. BECOMING EASILY ANNOYED OR IRRITABLE: NOT AT ALL
GAD7 TOTAL SCORE: 2
3. WORRYING TOO MUCH ABOUT DIFFERENT THINGS: NOT AT ALL
7. FEELING AFRAID AS IF SOMETHING AWFUL MIGHT HAPPEN: SEVERAL DAYS
GAD7 TOTAL SCORE: 2
5. BEING SO RESTLESS THAT IT IS HARD TO SIT STILL: NOT AT ALL
1. FEELING NERVOUS, ANXIOUS, OR ON EDGE: SEVERAL DAYS
2. NOT BEING ABLE TO STOP OR CONTROL WORRYING: NOT AT ALL